# Patient Record
Sex: MALE | Race: WHITE | NOT HISPANIC OR LATINO | Employment: FULL TIME | URBAN - METROPOLITAN AREA
[De-identification: names, ages, dates, MRNs, and addresses within clinical notes are randomized per-mention and may not be internally consistent; named-entity substitution may affect disease eponyms.]

---

## 2017-01-04 ENCOUNTER — GENERIC CONVERSION - ENCOUNTER (OUTPATIENT)
Dept: OTHER | Facility: OTHER | Age: 52
End: 2017-01-04

## 2017-01-12 ENCOUNTER — ALLSCRIPTS OFFICE VISIT (OUTPATIENT)
Dept: OTHER | Facility: OTHER | Age: 52
End: 2017-01-12

## 2017-02-01 ENCOUNTER — GENERIC CONVERSION - ENCOUNTER (OUTPATIENT)
Dept: OTHER | Facility: OTHER | Age: 52
End: 2017-02-01

## 2017-02-01 ENCOUNTER — APPOINTMENT (OUTPATIENT)
Dept: LAB | Facility: CLINIC | Age: 52
End: 2017-02-01
Payer: COMMERCIAL

## 2017-02-01 ENCOUNTER — TRANSCRIBE ORDERS (OUTPATIENT)
Dept: LAB | Facility: CLINIC | Age: 52
End: 2017-02-01

## 2017-02-01 DIAGNOSIS — R59.9 ADENOPATHY: Primary | ICD-10-CM

## 2017-02-01 LAB — VENIPUNCTURE: NORMAL

## 2017-02-01 PROCEDURE — 36415 COLL VENOUS BLD VENIPUNCTURE: CPT | Performed by: INTERNAL MEDICINE

## 2017-02-02 LAB
AMBIGUOUS TEST ORDER (HISTORICAL): NORMAL
ANTINUCLEAR ANTIBODIES, IFA (HISTORICAL): NEGATIVE
LYME IGG/IGM AB (HISTORICAL): <0.91 ISR (ref 0–0.9)
LYME IGM (HISTORICAL): <0.8 INDEX (ref 0–0.79)
RA LATEX TURBID (HISTORICAL): 11.5 IU/ML (ref 0–13.9)

## 2017-02-03 ENCOUNTER — ALLSCRIPTS OFFICE VISIT (OUTPATIENT)
Dept: OTHER | Facility: OTHER | Age: 52
End: 2017-02-03

## 2017-02-03 ENCOUNTER — GENERIC CONVERSION - ENCOUNTER (OUTPATIENT)
Dept: OTHER | Facility: OTHER | Age: 52
End: 2017-02-03

## 2017-02-03 DIAGNOSIS — M25.511 PAIN IN RIGHT SHOULDER: ICD-10-CM

## 2017-02-03 LAB
GLUCOSE FASTING (HISTORICAL): 93
HBA1C MFR BLD HPLC: 5.3 %
WRITTEN AUTHORIZATION (HISTORICAL): NORMAL

## 2017-02-06 LAB
EBV EARLY ANTIGEN AB, IGG (HISTORICAL): 83.6 U/ML (ref 0–8.9)
EBV INTERPRETATION (HISTORICAL): ABNORMAL
EPSTEIN-BARR NUCLEAR ANTIGEN AB IGG (HISTORICAL): >600 U/ML (ref 0–17.9)
EPSTEIN-BARR VCA IGG (HISTORICAL): >600 U/ML (ref 0–17.9)
EPSTEIN-BARR VCA IGM (HISTORICAL): <36 U/ML (ref 0–35.9)

## 2017-02-07 ENCOUNTER — GENERIC CONVERSION - ENCOUNTER (OUTPATIENT)
Dept: OTHER | Facility: OTHER | Age: 52
End: 2017-02-07

## 2017-06-09 ENCOUNTER — APPOINTMENT (OUTPATIENT)
Dept: LAB | Facility: CLINIC | Age: 52
End: 2017-06-09
Payer: COMMERCIAL

## 2017-06-09 ENCOUNTER — TRANSCRIBE ORDERS (OUTPATIENT)
Dept: LAB | Facility: CLINIC | Age: 52
End: 2017-06-09

## 2017-06-09 DIAGNOSIS — M25.50 PAIN IN JOINT, SITE UNSPECIFIED: ICD-10-CM

## 2017-06-09 DIAGNOSIS — M06.4 INFLAMMATORY POLYARTHROPATHY (HCC): ICD-10-CM

## 2017-06-09 DIAGNOSIS — M25.50 PAIN IN JOINT, SITE UNSPECIFIED: Primary | ICD-10-CM

## 2017-06-09 DIAGNOSIS — Z79.899 ENCOUNTER FOR LONG-TERM (CURRENT) USE OF OTHER MEDICATIONS: ICD-10-CM

## 2017-06-09 LAB
BACTERIA UR QL AUTO: NORMAL /HPF
BILIRUB UR QL STRIP: NEGATIVE
CK SERPL-CCNC: 64 U/L (ref 39–308)
CLARITY UR: CLEAR
COLOR UR: YELLOW
CRP SERPL QL: <3 MG/L
ERYTHROCYTE [SEDIMENTATION RATE] IN BLOOD: 5 MM/HOUR (ref 0–10)
GLUCOSE UR STRIP-MCNC: NEGATIVE MG/DL
HGB UR QL STRIP.AUTO: NEGATIVE
HYALINE CASTS #/AREA URNS LPF: NORMAL /LPF
KETONES UR STRIP-MCNC: NEGATIVE MG/DL
LEUKOCYTE ESTERASE UR QL STRIP: NEGATIVE
NITRITE UR QL STRIP: NEGATIVE
NON-SQ EPI CELLS URNS QL MICRO: NORMAL /HPF
PH UR STRIP.AUTO: 7.5 [PH] (ref 4.5–8)
PROT UR STRIP-MCNC: NEGATIVE MG/DL
RBC #/AREA URNS AUTO: NORMAL /HPF
SP GR UR STRIP.AUTO: 1.02 (ref 1–1.03)
URATE SERPL-MCNC: 6.3 MG/DL (ref 4.2–8)
UROBILINOGEN UR QL STRIP.AUTO: 1 E.U./DL
WBC #/AREA URNS AUTO: NORMAL /HPF

## 2017-06-09 PROCEDURE — 82550 ASSAY OF CK (CPK): CPT | Performed by: INTERNAL MEDICINE

## 2017-06-09 PROCEDURE — 86140 C-REACTIVE PROTEIN: CPT | Performed by: INTERNAL MEDICINE

## 2017-06-09 PROCEDURE — 86200 CCP ANTIBODY: CPT

## 2017-06-09 PROCEDURE — 84550 ASSAY OF BLOOD/URIC ACID: CPT | Performed by: INTERNAL MEDICINE

## 2017-06-09 PROCEDURE — 81001 URINALYSIS AUTO W/SCOPE: CPT | Performed by: INTERNAL MEDICINE

## 2017-06-09 PROCEDURE — 85652 RBC SED RATE AUTOMATED: CPT | Performed by: INTERNAL MEDICINE

## 2017-06-09 PROCEDURE — 36415 COLL VENOUS BLD VENIPUNCTURE: CPT | Performed by: INTERNAL MEDICINE

## 2017-06-12 LAB — CCP IGA+IGG SERPL IA-ACNC: 13 UNITS (ref 0–19)

## 2017-10-06 ENCOUNTER — HOSPITAL ENCOUNTER (EMERGENCY)
Facility: HOSPITAL | Age: 52
Discharge: HOME | End: 2017-10-07
Attending: EMERGENCY MEDICINE | Admitting: EMERGENCY MEDICINE
Payer: COMMERCIAL

## 2017-10-06 DIAGNOSIS — R60.9 PERIPHERAL EDEMA: Primary | ICD-10-CM

## 2017-10-06 RX ORDER — OXYCODONE HYDROCHLORIDE AND ACETAMINOPHEN 5; 325 MG/1; MG/1
1 TABLET ORAL EVERY 8 HOURS PRN
COMMUNITY
End: 2019-10-05 | Stop reason: HOSPADM

## 2017-10-06 RX ORDER — TADALAFIL 10 MG/1
10 TABLET ORAL DAILY PRN
COMMUNITY
End: 2018-03-08

## 2017-10-06 RX ORDER — METOPROLOL TARTRATE 50 MG/1
50 TABLET, FILM COATED ORAL EVERY 12 HOURS SCHEDULED
COMMUNITY
End: 2017-11-21

## 2017-10-07 VITALS
DIASTOLIC BLOOD PRESSURE: 59 MMHG | HEART RATE: 62 BPM | BODY MASS INDEX: 31.39 KG/M2 | WEIGHT: 200 LBS | RESPIRATION RATE: 22 BRPM | TEMPERATURE: 98.4 F | HEIGHT: 67 IN | SYSTOLIC BLOOD PRESSURE: 143 MMHG | OXYGEN SATURATION: 96 %

## 2017-10-07 LAB
ALBUMIN SERPL BCP-MCNC: 3.5 G/DL (ref 3.5–5)
ALP SERPL-CCNC: 76 U/L (ref 46–116)
ALT SERPL W P-5'-P-CCNC: 32 U/L (ref 12–78)
ANION GAP SERPL CALCULATED.3IONS-SCNC: 8 MMOL/L (ref 4–13)
APTT PPP: 24 SECONDS (ref 23–35)
AST SERPL W P-5'-P-CCNC: 21 U/L (ref 5–45)
BASOPHILS # BLD AUTO: 0 THOUSANDS/ΜL (ref 0–0.1)
BASOPHILS NFR BLD AUTO: 1 % (ref 0–1)
BILIRUB SERPL-MCNC: 0.3 MG/DL (ref 0.2–1)
BUN SERPL-MCNC: 11 MG/DL (ref 5–25)
CALCIUM SERPL-MCNC: 9 MG/DL (ref 8.3–10.1)
CHLORIDE SERPL-SCNC: 106 MMOL/L (ref 100–108)
CO2 SERPL-SCNC: 28 MMOL/L (ref 21–32)
CREAT SERPL-MCNC: 1.08 MG/DL (ref 0.6–1.3)
DEPRECATED D DIMER PPP: 430 NG/ML (FEU) (ref 190–520)
EOSINOPHIL # BLD AUTO: 0.5 THOUSAND/ΜL (ref 0–0.61)
EOSINOPHIL NFR BLD AUTO: 10 % (ref 0–6)
ERYTHROCYTE [DISTWIDTH] IN BLOOD BY AUTOMATED COUNT: 13.6 % (ref 11.6–15.1)
GFR SERPL CREATININE-BSD FRML MDRD: 77 ML/MIN/1.73SQ M
GLUCOSE SERPL-MCNC: 93 MG/DL (ref 65–140)
HCT VFR BLD AUTO: 37.7 % (ref 42–52)
HGB BLD-MCNC: 12.5 G/DL (ref 14–18)
INR PPP: 1.06 (ref 0.86–1.16)
LYMPHOCYTES # BLD AUTO: 1.5 THOUSANDS/ΜL (ref 0.6–4.47)
LYMPHOCYTES NFR BLD AUTO: 30 % (ref 14–44)
MAGNESIUM SERPL-MCNC: 2 MG/DL (ref 1.6–2.6)
MCH RBC QN AUTO: 31 PG (ref 27–31)
MCHC RBC AUTO-ENTMCNC: 33.2 G/DL (ref 31.4–37.4)
MCV RBC AUTO: 93 FL (ref 82–98)
MONOCYTES # BLD AUTO: 0.5 THOUSAND/ΜL (ref 0.17–1.22)
MONOCYTES NFR BLD AUTO: 11 % (ref 4–12)
NEUTROPHILS # BLD AUTO: 2.4 THOUSANDS/ΜL (ref 1.85–7.62)
NEUTS SEG NFR BLD AUTO: 48 % (ref 43–75)
NRBC BLD AUTO-RTO: 0 /100 WBCS
NT-PROBNP SERPL-MCNC: 613 PG/ML
PHOSPHATE SERPL-MCNC: 4 MG/DL (ref 2.7–4.5)
PLATELET # BLD AUTO: 239 THOUSANDS/UL (ref 130–400)
PMV BLD AUTO: 7.3 FL (ref 8.9–12.7)
POTASSIUM SERPL-SCNC: 3.8 MMOL/L (ref 3.5–5.3)
PROT SERPL-MCNC: 6.4 G/DL (ref 6.4–8.2)
PROTHROMBIN TIME: 11.1 SECONDS (ref 9.4–11.7)
RBC # BLD AUTO: 4.04 MILLION/UL (ref 4.7–6.1)
SODIUM SERPL-SCNC: 142 MMOL/L (ref 136–145)
TROPONIN I SERPL-MCNC: <0.02 NG/ML
TSH SERPL DL<=0.05 MIU/L-ACNC: 4.87 UIU/ML (ref 0.36–3.74)
WBC # BLD AUTO: 4.9 THOUSAND/UL (ref 4.8–10.8)

## 2017-10-07 PROCEDURE — 84484 ASSAY OF TROPONIN QUANT: CPT | Performed by: EMERGENCY MEDICINE

## 2017-10-07 PROCEDURE — 36415 COLL VENOUS BLD VENIPUNCTURE: CPT | Performed by: EMERGENCY MEDICINE

## 2017-10-07 PROCEDURE — 99283 EMERGENCY DEPT VISIT LOW MDM: CPT

## 2017-10-07 PROCEDURE — 80053 COMPREHEN METABOLIC PANEL: CPT | Performed by: EMERGENCY MEDICINE

## 2017-10-07 PROCEDURE — 84100 ASSAY OF PHOSPHORUS: CPT | Performed by: EMERGENCY MEDICINE

## 2017-10-07 PROCEDURE — 84443 ASSAY THYROID STIM HORMONE: CPT | Performed by: EMERGENCY MEDICINE

## 2017-10-07 PROCEDURE — 83735 ASSAY OF MAGNESIUM: CPT | Performed by: EMERGENCY MEDICINE

## 2017-10-07 PROCEDURE — 83880 ASSAY OF NATRIURETIC PEPTIDE: CPT | Performed by: EMERGENCY MEDICINE

## 2017-10-07 PROCEDURE — 93005 ELECTROCARDIOGRAM TRACING: CPT | Performed by: EMERGENCY MEDICINE

## 2017-10-07 PROCEDURE — 85610 PROTHROMBIN TIME: CPT | Performed by: EMERGENCY MEDICINE

## 2017-10-07 PROCEDURE — 85025 COMPLETE CBC W/AUTO DIFF WBC: CPT | Performed by: EMERGENCY MEDICINE

## 2017-10-07 PROCEDURE — 85379 FIBRIN DEGRADATION QUANT: CPT | Performed by: EMERGENCY MEDICINE

## 2017-10-07 PROCEDURE — 85730 THROMBOPLASTIN TIME PARTIAL: CPT | Performed by: EMERGENCY MEDICINE

## 2017-10-07 NOTE — DISCHARGE INSTRUCTIONS
Leg Edema   WHAT YOU NEED TO KNOW:   Leg edema is swelling caused by fluid buildup  Your legs may swell if you sit or stand for long periods of time, are pregnant, or are injured  Swelling may also occur if you have heart failure or circulation problems  This means that your heart does not pump blood through your body as it should  DISCHARGE INSTRUCTIONS:   Self-care:   · Elevate your legs:  Raise your legs above the level of your heart as often as you can  This will help decrease swelling and pain  Prop your legs on pillows or blankets to keep them elevated comfortably  · Wear pressure stockings: These tight stockings put pressure on your legs to promote blood flow and prevent blood clots  Wear the stockings during the day  Do not wear them while you sleep  · Apply heat:  Heat helps decrease pain and swelling  Apply heat on the area for 20 to 30 minutes every 2 hours for as many days as directed  · Stay active:  Do not stand or sit for long periods of time  Ask your healthcare provider about the best exercise plan for you  · Eat healthy foods:  Healthy foods include fruits, vegetables, whole-grain breads, low-fat dairy products, beans, lean meats, and fish  Ask if you need to be on a special diet  Limit salt  Salt will make your body hold even more fluid  Follow up with your healthcare provider as directed:  Write down your questions so you remember to ask them during your visits  Contact your healthcare provider if:   · You have a fever or feel more tired than usual     · The veins in your legs look larger than usual  They may look full or bulging  · Your legs itch or feel heavy  · You have red or white areas or sores on your legs  The skin may also appear dimpled or have indentations  · You are gaining weight  · You have trouble moving your ankles  · The swelling does not go away, or other parts of your body swell      · You have questions or concerns about your condition or care   Return to the emergency department if:   · You cannot walk  · You feel faint or confused  · Your skin turns blue or gray  · Your leg feels warm, tender, and painful  It may be swollen and red  · You have chest pain or trouble breathing that is worse when you lie down  · You suddenly feel lightheaded and have trouble breathing  · You have new and sudden chest pain  You may have more pain when you take deep breaths or cough  You may also cough up blood  © 2017 [address]  Information is for End User's use only and may not be sold, redistributed or otherwise used for commercial purposes  All illustrations and images included in CareNotes® are the copyrighted property of A D A M , Inc  or William Godoy  The above information is an  only  It is not intended as medical advice for individual conditions or treatments  Talk to your doctor, nurse or pharmacist before following any medical regimen to see if it is safe and effective for you

## 2017-10-07 NOTE — ED PROVIDER NOTES
History  Chief Complaint   Patient presents with    Leg Swelling     Pt c/o L leg swelling since 10pm        57-year-old white male presents with complaints of bilateral lower extremity edema that he noticed tonight  States left was worse than right  No prior history of same  Not on any diuretics  No history of heart failure  No history of DVT  Denies chest pain, states he feels slightly short of breath, patient in paroxysmal AFib only on aspirin and no other blood thinners  History provided by:  Patient and spouse      Prior to Admission Medications   Prescriptions Last Dose Informant Patient Reported? Taking? Dronedarone HCl (MULTAQ PO) 10/6/2017 at Unknown time  Yes Yes   Sig: Take 400 mg by mouth 2 (two) times a day     aspirin 81 MG tablet 10/6/2017 at Unknown time  Yes Yes   Sig: Take 81 mg by mouth daily  diltiazem (CARDIZEM CD) 360 MG 24 hr capsule 10/6/2017 at Unknown time  Yes Yes   Sig: Take 360 mg by mouth daily  esomeprazole (NexIUM) 20 mg capsule  Self Yes Yes   Sig: Take 20 mg by mouth every morning before breakfast   metoprolol tartrate (LOPRESSOR) 50 mg tablet 10/6/2017 at Unknown time Self Yes Yes   Sig: Take 50 mg by mouth every 12 (twelve) hours   oxyCODONE-acetaminophen (PERCOCET) 5-325 mg per tablet  Self Yes Yes   Sig: Take 1 tablet by mouth every 8 (eight) hours as needed for moderate pain   tadalafil (CIALIS) 10 MG tablet 10/6/2017 at Unknown time Self Yes Yes   Sig: Take 10 mg by mouth daily as needed for erectile dysfunction      Facility-Administered Medications: None       Past Medical History:   Diagnosis Date    Atrial fibrillation ([facility] [address] )     Graham Whyte infection     GERD (gastroesophageal reflux disease)        Past Surgical History:   Procedure Laterality Date    CHOLECYSTECTOMY      LAPAROSCOPIC GASTRIC BANDING      gastric sleeve       History reviewed  No pertinent family history  I have reviewed and agree with the history as documented      Social History   Substance Use Topics    Smoking status: Current Every Day Smoker     Types: E-Cigarettes    Smokeless tobacco: Never Used    Alcohol use No        Review of Systems   Constitutional: Negative for chills and fever  HENT: Negative  Eyes: Negative  Respiratory: Positive for shortness of breath  Negative for chest tightness, wheezing and stridor  Cardiovascular: Positive for leg swelling  Negative for chest pain  Patient id out AFib   Gastrointestinal: Negative  Genitourinary: Negative  Musculoskeletal: Negative  Skin: Negative  Neurological: Negative  Hematological: Negative  Psychiatric/Behavioral: Negative  All other systems reviewed and are negative  Physical Exam  ED Triage Vitals [10/06/17 2347]   Temperature Pulse Respirations Blood Pressure SpO2   98 4 °F (36 9 °C) 67 16 137/64 98 %      Temp Source Heart Rate Source Patient Position - Orthostatic VS BP Location FiO2 (%)   Oral Monitor Lying Left arm --      Pain Score       No Pain           Physical Exam   Constitutional: He is oriented to person, place, and time  He appears well-developed and well-nourished  HENT:   Head: Normocephalic and atraumatic  Right Ear: External ear normal    Left Ear: External ear normal    Nose: Nose normal    Mouth/Throat: Oropharynx is clear and moist    Eyes: Conjunctivae and EOM are normal    Neck: Normal range of motion  Neck supple  Cardiovascular: Normal rate, normal heart sounds, intact distal pulses and normal pulses  An irregularly irregular rhythm present  Pulmonary/Chest: Effort normal and breath sounds normal    Abdominal: Soft  Bowel sounds are normal    Musculoskeletal: Normal range of motion  Neurological: He is alert and oriented to person, place, and time  Skin: Skin is warm and dry  Capillary refill takes less than 2 seconds  Psychiatric: He has a normal mood and affect   His behavior is normal  Judgment and thought content normal    Nursing note and vitals reviewed  ED Medications  Medications - No data to display    Diagnostic Studies  Labs Reviewed   CBC AND DIFFERENTIAL - Abnormal        Result Value Ref Range Status    RBC 4 04 (*) 4 70 - 6 10 Million/uL Final    Hemoglobin 12 5 (*) 14 0 - 18 0 g/dL Final    Hematocrit 37 7 (*) 42 0 - 52 0 % Final    MPV 7 3 (*) 8 9 - 12 7 fL Final    Eosinophils Relative 10 (*) 0 - 6 % Final    WBC 4 90  4 80 - 10 80 Thousand/uL Final    MCV 93  82 - 98 fL Final    MCH 31 0  27 0 - 31 0 pg Final    MCHC 33 2  31 4 - 37 4 g/dL Final    RDW 13 6  11 6 - 15 1 % Final    Platelets 927  924 - 400 Thousands/uL Final    nRBC 0  /100 WBCs Final    Neutrophils Relative 48  43 - 75 % Final    Lymphocytes Relative 30  14 - 44 % Final    Monocytes Relative 11  4 - 12 % Final    Basophils Relative 1  0 - 1 % Final    Neutrophils Absolute 2 40  1 85 - 7 62 Thousands/µL Final    Lymphocytes Absolute 1 50  0 60 - 4 47 Thousands/µL Final    Monocytes Absolute 0 50  0 17 - 1 22 Thousand/µL Final    Eosinophils Absolute 0 50  0 00 - 0 61 Thousand/µL Final    Basophils Absolute 0 00  0 00 - 0 10 Thousands/µL Final   NT-BNP PRO (BRAIN NATRIURETIC PEPTIDE) - Abnormal     NT-proBNP 613 (*) <125 pg/mL Final   TSH, 3RD GENERATION - Abnormal     TSH 3RD GENERATON 4 874 (*) 0 358 - 3 740 uIU/mL Final    Narrative:     Patients undergoing fluorescein dye angiography may retain small amounts of fluorescein in the body for 48-72 hours post procedure  Samples containing fluorescein can produce falsely depressed TSH values  If the patient had this procedure,a specimen should be resubmitted post fluorescein clearance  D-DIMER, QUANTITATIVE - Normal    D-Dimer, Quant 430  190 - 520 ng/ml (FEU) Final    Comment:   Reference and upper limits to exclude DVT and PE are the same  Do not use to exclude if clinical symptoms are present         TROPONIN I - Normal    Troponin I <0 02  <=0 04 ng/mL Final    Comment: 3Autovalidation override Narrative:     [employer] Chemistry analyzer 99% cutoff is > 0 04 ng/mL in network labs    o cTnI 99% cutoff is useful only when applied to patients in the clinical setting of myocardial ischemia  o cTnI 99% cutoff should be interpreted in the context of clinical history, ECG findings and possibly cardiac imaging to establish correct diagnosis  o cTnI 99% cutoff may be suggestive but clearly not indicative of a coronary event without the clinical setting of myocardial ischemia  PROTIME-INR - Normal    Protime 11 1  9 4 - 11 7 seconds Final    INR 1 06  0 86 - 1 16 Final   APTT - Normal    PTT 24  23 - 35 seconds Final    Narrative: Therapeutic Heparin Range = 60-90 seconds   MAGNESIUM - Normal    Magnesium 2 0  1 6 - 2 6 mg/dL Final   PHOSPHORUS - Normal    Phosphorus 4 0  2 7 - 4 5 mg/dL Final   COMPREHENSIVE METABOLIC PANEL    Sodium 278  136 - 145 mmol/L Final    Potassium 3 8  3 5 - 5 3 mmol/L Final    Chloride 106  100 - 108 mmol/L Final    CO2 28  21 - 32 mmol/L Final    Anion Gap 8  4 - 13 mmol/L Final    BUN 11  5 - 25 mg/dL Final    Creatinine 1 08  0 60 - 1 30 mg/dL Final    Comment: Standardized to IDMS reference method    Glucose 93  65 - 140 mg/dL Final    Comment:   If the patient is fasting, the ADA then defines impaired fasting glucose as > 100 mg/dL and diabetes as > or equal to 123 mg/dL  Specimen collection should occur prior to Sulfasalazine administration due to the potential for falsely depressed results  Specimen collection should occur prior to Sulfapyridine administration due to the potential for falsely elevated results  Calcium 9 0  8 3 - 10 1 mg/dL Final    AST 21  5 - 45 U/L Final    Comment:   Specimen collection should occur prior to Sulfasalazine administration due to the potential for falsely depressed results  ALT 32  12 - 78 U/L Final    Comment:   Specimen collection should occur prior to Sulfasalazine administration due to the potential for falsely depressed results  Alkaline Phosphatase 76  46 - 116 U/L Final    Total Protein 6 4  6 4 - 8 2 g/dL Final    Albumin 3 5  3 5 - 5 0 g/dL Final    Total Bilirubin 0 30  0 20 - 1 00 mg/dL Final    eGFR 77  ml/min/1 73sq m Final    Narrative:     National Kidney Disease Education Program recommendations are as follows:  GFR calculation is accurate only with a steady state creatinine  Chronic Kidney disease less than 60 ml/min/1 73 sq  meters  Kidney failure less than 15 ml/min/1 73 sq  meters  UA W REFLEX TO MICROSCOPIC WITH REFLEX TO CULTURE       No orders to display       Procedures  ECG 12 Lead Documentation  Date/Time: 10/7/2017 12:10 AM  Performed by: Davy Mckeon  Authorized by: Berenice DE LA VEGA     Indications / Diagnosis:  Leg swelling/palpitations  ECG reviewed by me, the ED Provider: yes    Patient location:  ED  Previous ECG:     Comparison to cardiac monitor: Yes (Irreg irreg 66)    Interpretation:     Interpretation: abnormal    Rate:     ECG rate:  66    ECG rate assessment: normal    Rhythm:     Rhythm: atrial fibrillation    Ectopy:     Ectopy: none    QRS:     QRS axis:  Normal    QRS intervals:  Normal  Conduction:     Conduction: normal    ST segments:     ST segments:  Normal  T waves:     T waves: normal            Phone Contacts  ED Phone Contact    ED Course  ED Course                                MDM  CritCare Time    Disposition  Final diagnoses:   Peripheral edema     ED Disposition     ED Disposition Condition Comment    Discharge  Faraz Merline discharge to home/self care  Condition at discharge: Stable        Follow-up Information     Follow up With Specialties Details Why Contact Gamaliel Swartz DO Family Medicine Schedule an appointment as soon as possible for a visit in 3 days  [address]  [phone]          Patient's Medications   Discharge Prescriptions    No medications on file     No discharge procedures on file      ED Provider  Electronically Signed by Sabas Rollins MD  10/07/17 5256

## 2017-10-09 LAB
ATRIAL RATE: 394 BPM
QRS AXIS: 8 DEGREES
QRSD INTERVAL: 98 MS
QT INTERVAL: 448 MS
QTC INTERVAL: 469 MS
T WAVE AXIS: 5 DEGREES
VENTRICULAR RATE: 66 BPM

## 2017-10-12 ENCOUNTER — GENERIC CONVERSION - ENCOUNTER (OUTPATIENT)
Dept: OTHER | Facility: OTHER | Age: 52
End: 2017-10-12

## 2017-10-23 ENCOUNTER — GENERIC CONVERSION - ENCOUNTER (OUTPATIENT)
Dept: OTHER | Facility: OTHER | Age: 52
End: 2017-10-23

## 2017-10-29 LAB
. (HISTORICAL): NORMAL

## 2017-11-01 ENCOUNTER — ALLSCRIPTS OFFICE VISIT (OUTPATIENT)
Dept: OTHER | Facility: OTHER | Age: 52
End: 2017-11-01

## 2017-11-01 ENCOUNTER — GENERIC CONVERSION - ENCOUNTER (OUTPATIENT)
Dept: OTHER | Facility: OTHER | Age: 52
End: 2017-11-01

## 2017-11-05 LAB
CULTURE RESULT (HISTORICAL): NORMAL
RESULT 1 (HISTORICAL): NORMAL

## 2017-11-21 ENCOUNTER — APPOINTMENT (EMERGENCY)
Dept: RADIOLOGY | Facility: HOSPITAL | Age: 52
End: 2017-11-21
Payer: COMMERCIAL

## 2017-11-21 ENCOUNTER — HOSPITAL ENCOUNTER (EMERGENCY)
Facility: HOSPITAL | Age: 52
Discharge: HOME | End: 2017-11-21
Attending: EMERGENCY MEDICINE | Admitting: EMERGENCY MEDICINE
Payer: COMMERCIAL

## 2017-11-21 VITALS
TEMPERATURE: 97.6 F | WEIGHT: 215 LBS | HEIGHT: 67 IN | SYSTOLIC BLOOD PRESSURE: 98 MMHG | BODY MASS INDEX: 33.74 KG/M2 | OXYGEN SATURATION: 96 % | RESPIRATION RATE: 16 BRPM | HEART RATE: 60 BPM | DIASTOLIC BLOOD PRESSURE: 57 MMHG

## 2017-11-21 DIAGNOSIS — R10.9 ABDOMINAL PAIN: Primary | ICD-10-CM

## 2017-11-21 LAB
ALBUMIN SERPL BCP-MCNC: 3.6 G/DL (ref 3.5–5)
ALP SERPL-CCNC: 87 U/L (ref 46–116)
ALT SERPL W P-5'-P-CCNC: 35 U/L (ref 12–78)
ANION GAP SERPL CALCULATED.3IONS-SCNC: 8 MMOL/L (ref 4–13)
AST SERPL W P-5'-P-CCNC: 22 U/L (ref 5–45)
BASOPHILS # BLD AUTO: 0 THOUSANDS/ΜL (ref 0–0.1)
BASOPHILS NFR BLD AUTO: 1 % (ref 0–1)
BILIRUB SERPL-MCNC: 0.6 MG/DL (ref 0.2–1)
BILIRUB UR QL STRIP: ABNORMAL
BUN SERPL-MCNC: 11 MG/DL (ref 5–25)
CALCIUM SERPL-MCNC: 8.8 MG/DL (ref 8.3–10.1)
CHLORIDE SERPL-SCNC: 103 MMOL/L (ref 100–108)
CLARITY UR: CLEAR
CO2 SERPL-SCNC: 28 MMOL/L (ref 21–32)
COLOR UR: YELLOW
CREAT SERPL-MCNC: 1.03 MG/DL (ref 0.6–1.3)
EOSINOPHIL # BLD AUTO: 0.5 THOUSAND/ΜL (ref 0–0.61)
EOSINOPHIL NFR BLD AUTO: 9 % (ref 0–6)
ERYTHROCYTE [DISTWIDTH] IN BLOOD BY AUTOMATED COUNT: 13.1 % (ref 11.6–15.1)
GFR SERPL CREATININE-BSD FRML MDRD: 82 ML/MIN/1.73SQ M
GLUCOSE SERPL-MCNC: 93 MG/DL (ref 65–140)
GLUCOSE UR STRIP-MCNC: NEGATIVE MG/DL
HCT VFR BLD AUTO: 40.4 % (ref 42–52)
HGB BLD-MCNC: 13.3 G/DL (ref 14–18)
HGB UR QL STRIP.AUTO: NEGATIVE
KETONES UR STRIP-MCNC: NEGATIVE MG/DL
LEUKOCYTE ESTERASE UR QL STRIP: NEGATIVE
LIPASE SERPL-CCNC: 64 U/L (ref 73–393)
LYMPHOCYTES # BLD AUTO: 1.2 THOUSANDS/ΜL (ref 0.6–4.47)
LYMPHOCYTES NFR BLD AUTO: 23 % (ref 14–44)
MCH RBC QN AUTO: 30.6 PG (ref 27–31)
MCHC RBC AUTO-ENTMCNC: 32.9 G/DL (ref 31.4–37.4)
MCV RBC AUTO: 93 FL (ref 82–98)
MONOCYTES # BLD AUTO: 0.5 THOUSAND/ΜL (ref 0.17–1.22)
MONOCYTES NFR BLD AUTO: 10 % (ref 4–12)
NEUTROPHILS # BLD AUTO: 3 THOUSANDS/ΜL (ref 1.85–7.62)
NEUTS SEG NFR BLD AUTO: 57 % (ref 43–75)
NITRITE UR QL STRIP: NEGATIVE
NRBC BLD AUTO-RTO: 0 /100 WBCS
PH UR STRIP.AUTO: 6.5 [PH] (ref 5–9)
PLATELET # BLD AUTO: 283 THOUSANDS/UL (ref 130–400)
PMV BLD AUTO: 6.7 FL (ref 8.9–12.7)
POTASSIUM SERPL-SCNC: 3.9 MMOL/L (ref 3.5–5.3)
PROT SERPL-MCNC: 6.8 G/DL (ref 6.4–8.2)
PROT UR STRIP-MCNC: NEGATIVE MG/DL
RBC # BLD AUTO: 4.35 MILLION/UL (ref 4.7–6.1)
SODIUM SERPL-SCNC: 139 MMOL/L (ref 136–145)
SP GR UR STRIP.AUTO: 1.02 (ref 1–1.03)
UROBILINOGEN UR QL STRIP.AUTO: 0.2 E.U./DL
WBC # BLD AUTO: 5.3 THOUSAND/UL (ref 4.8–10.8)

## 2017-11-21 PROCEDURE — 96374 THER/PROPH/DIAG INJ IV PUSH: CPT

## 2017-11-21 PROCEDURE — 85025 COMPLETE CBC W/AUTO DIFF WBC: CPT | Performed by: EMERGENCY MEDICINE

## 2017-11-21 PROCEDURE — 99284 EMERGENCY DEPT VISIT MOD MDM: CPT

## 2017-11-21 PROCEDURE — 74176 CT ABD & PELVIS W/O CONTRAST: CPT

## 2017-11-21 PROCEDURE — 96361 HYDRATE IV INFUSION ADD-ON: CPT

## 2017-11-21 PROCEDURE — 83690 ASSAY OF LIPASE: CPT | Performed by: EMERGENCY MEDICINE

## 2017-11-21 PROCEDURE — 81003 URINALYSIS AUTO W/O SCOPE: CPT | Performed by: EMERGENCY MEDICINE

## 2017-11-21 PROCEDURE — 36415 COLL VENOUS BLD VENIPUNCTURE: CPT | Performed by: EMERGENCY MEDICINE

## 2017-11-21 PROCEDURE — 80053 COMPREHEN METABOLIC PANEL: CPT | Performed by: EMERGENCY MEDICINE

## 2017-11-21 RX ORDER — METOPROLOL TARTRATE 50 MG/1
25 TABLET, FILM COATED ORAL EVERY 12 HOURS SCHEDULED
COMMUNITY
End: 2018-03-08

## 2017-11-21 RX ORDER — PANTOPRAZOLE SODIUM 40 MG/1
40 TABLET, DELAYED RELEASE ORAL DAILY
COMMUNITY
End: 2018-09-06 | Stop reason: SDUPTHER

## 2017-11-21 RX ORDER — KETOROLAC TROMETHAMINE 30 MG/ML
15 INJECTION, SOLUTION INTRAMUSCULAR; INTRAVENOUS ONCE
Status: COMPLETED | OUTPATIENT
Start: 2017-11-21 | End: 2017-11-21

## 2017-11-21 RX ORDER — ONDANSETRON 2 MG/ML
4 INJECTION INTRAMUSCULAR; INTRAVENOUS ONCE
Status: DISCONTINUED | OUTPATIENT
Start: 2017-11-21 | End: 2017-11-21 | Stop reason: HOSPADM

## 2017-11-21 RX ADMIN — SODIUM CHLORIDE 1000 ML: 0.9 INJECTION, SOLUTION INTRAVENOUS at 15:57

## 2017-11-21 RX ADMIN — KETOROLAC TROMETHAMINE 15 MG: 30 INJECTION, SOLUTION INTRAMUSCULAR at 16:02

## 2017-11-21 NOTE — ED PROVIDER NOTES
History  Chief Complaint   Patient presents with    Abdominal Pain     abdominal pain x 2 days rt side in area where he had 2 surgeries,having less BM's     52M hx gastric sleeve c/o R-sided mid abd pain since Sunday, intermittent, 7/10 - 10/10  Currently pain free  Pt also has R flank pain  No urinary sxs  Prior to Admission Medications   Prescriptions Last Dose Informant Patient Reported? Taking? Dronedarone HCl (MULTAQ PO)   Yes No   Sig: Take 400 mg by mouth 2 (two) times a day     aspirin 81 MG tablet   Yes No   Sig: Take 81 mg by mouth daily  diltiazem (CARDIZEM CD) 360 MG 24 hr capsule   Yes No   Sig: Take 360 mg by mouth daily  metoprolol tartrate (LOPRESSOR) 50 mg tablet   Yes Yes   Sig: Take 25 mg by mouth every 12 (twelve) hours   oxyCODONE-acetaminophen (PERCOCET) 5-325 mg per tablet  Self Yes No   Sig: Take 1 tablet by mouth every 8 (eight) hours as needed for moderate pain   pantoprazole (PROTONIX) 40 mg tablet   Yes Yes   Sig: Take 40 mg by mouth daily   tadalafil (CIALIS) 10 MG tablet  Self Yes No   Sig: Take 10 mg by mouth daily as needed for erectile dysfunction      Facility-Administered Medications: None       Past Medical History:   Diagnosis Date    Atrial fibrillation ([facility])     Graham Whyte infection     GERD (gastroesophageal reflux disease)        Past Surgical History:   Procedure Laterality Date    APPENDECTOMY      CHOLECYSTECTOMY      LAPAROSCOPIC GASTRIC BANDING      gastric sleeve       History reviewed  No pertinent family history  I have reviewed and agree with the history as documented  Social History   Substance Use Topics    Smoking status: Current Every Day Smoker     Types: E-Cigarettes    Smokeless tobacco: Never Used    Alcohol use No        Review of Systems   Constitutional: Negative for fever  Respiratory: Negative for cough  Gastrointestinal: Positive for abdominal pain, diarrhea and nausea  Negative for vomiting  Musculoskeletal: Negative for back pain  Neurological: Negative for headaches  All other systems reviewed and are negative  Physical Exam  ED Triage Vitals   Temperature Pulse Respirations Blood Pressure SpO2   11/21/17 1509 11/21/17 1513 11/21/17 1513 11/21/17 1513 11/21/17 1513   97 6 °F (36 4 °C) 62 16 113/63 96 %      Temp Source Heart Rate Source Patient Position - Orthostatic VS BP Location FiO2 (%)   11/21/17 1509 11/21/17 1509 11/21/17 1513 11/21/17 1509 --   Oral Monitor Lying Left arm       Pain Score       11/21/17 1513       7           Orthostatic Vital Signs  Vitals:    11/21/17 1513 11/21/17 1745   BP: 113/63 98/57   Pulse: 62 60   Patient Position - Orthostatic VS: Lying        Physical Exam   Constitutional: He is oriented to person, place, and time  He appears well-developed  HENT:   Mouth/Throat: Oropharynx is clear and moist    Eyes: Conjunctivae are normal    Neck: Neck supple  Cardiovascular: Normal rate and regular rhythm  Pulmonary/Chest: Effort normal and breath sounds normal    Abdominal: Soft  Bowel sounds are normal  There is no tenderness  Neurological: He is alert and oriented to person, place, and time  Skin: Skin is warm and dry  Psychiatric: He has a normal mood and affect  Vitals reviewed        ED Medications  Medications   ondansetron (ZOFRAN) injection 4 mg (4 mg Intravenous Not Given 11/21/17 1601)   sodium chloride 0 9 % bolus 1,000 mL (0 mL Intravenous Stopped 11/21/17 1740)   ketorolac (TORADOL) 30 mg/mL injection 15 mg (15 mg Intravenous Given 11/21/17 1602)       Diagnostic Studies  Results Reviewed     Procedure Component Value Units Date/Time    Comprehensive metabolic panel [[id number]] Collected:  11/21/17 1558    Lab Status:  Final result Specimen:  Blood from Arm, Right Updated:  11/21/17 1625     Sodium 139 mmol/L      Potassium 3 9 mmol/L      Chloride 103 mmol/L      CO2 28 mmol/L      Anion Gap 8 mmol/L      BUN 11 mg/dL      Creatinine 1 03 mg/dL      Glucose 93 mg/dL      Calcium 8 8 mg/dL      AST 22 U/L      ALT 35 U/L      Alkaline Phosphatase 87 U/L      Total Protein 6 8 g/dL      Albumin 3 6 g/dL      Total Bilirubin 0 60 mg/dL      eGFR 82 ml/min/1 73sq m     Narrative:         National Kidney Disease Education Program recommendations are as follows:  GFR calculation is accurate only with a steady state creatinine  Chronic Kidney disease less than 60 ml/min/1 73 sq  meters  Kidney failure less than 15 ml/min/1 73 sq  meters      Lipase [[id number]]  (Abnormal) Collected:  11/21/17 1558    Lab Status:  Final result Specimen:  Blood from Arm, Right Updated:  11/21/17 1625     Lipase 64 (L) u/L     UA w Reflex to Microscopic w Reflex to Culture [[id number]]  (Abnormal) Collected:  11/21/17 1602    Lab Status:  Final result Specimen:  Urine from Urine, Clean Catch Updated:  11/21/17 1611     Color, UA Yellow     Clarity, UA Clear     Specific Gravity, UA 1 025     pH, UA 6 5     Leukocytes, UA Negative     Nitrite, UA Negative     Protein, UA Negative mg/dl      Glucose, UA Negative mg/dl      Ketones, UA Negative mg/dl      Urobilinogen, UA 0 2 E U /dl      Bilirubin, UA Interference- unable to analyze (A)     Blood, UA Negative    CBC and differential [[id number]]  (Abnormal) Collected:  11/21/17 1558    Lab Status:  Final result Specimen:  Blood from Arm, Right Updated:  11/21/17 1603     WBC 5 30 Thousand/uL      RBC 4 35 (L) Million/uL      Hemoglobin 13 3 (L) g/dL      Hematocrit 40 4 (L) %      MCV 93 fL      MCH 30 6 pg      MCHC 32 9 g/dL      RDW 13 1 %      MPV 6 7 (L) fL      Platelets 476 Thousands/uL      nRBC 0 /100 WBCs      Neutrophils Relative 57 %      Lymphocytes Relative 23 %      Monocytes Relative 10 %      Eosinophils Relative 9 (H) %      Basophils Relative 1 %      Neutrophils Absolute 3 00 Thousands/µL      Lymphocytes Absolute 1 20 Thousands/µL      Monocytes Absolute 0 50 Thousand/µL      Eosinophils Absolute 0 50 Thousand/µL Basophils Absolute 0 00 Thousands/µL                  CT renal stone study abdomen pelvis without contrast   Final Result by Oli Steen MD (11/21 1658)      No acute inflammatory changes within the abdomen or the pelvis  Workstation performed: [id number]                    Procedures  Procedures       Phone Contacts  ED Phone Contact    ED Course  ED Course                                MDM  Number of Diagnoses or Management Options  Abdominal pain:   Diagnosis management comments: CT and labs wnl  Pain improved  Pt given instructions in supportive care, advised to see PCP in 2-3 days for a re-check  CritCare Time    Disposition  Final diagnoses:   Abdominal pain     Time reflects when diagnosis was documented in both MDM as applicable and the Disposition within this note     Time User Action Codes Description Comment    11/21/2017  6:03 PM [place] Add [R10 9] Abdominal pain       ED Disposition     ED Disposition Condition Comment    Discharge  Leonel Loyd discharge to home/self care  Condition at discharge: Stable        Follow-up Information     Follow up With Specialties Details Why Contact [facility], [address] In 2 days  [address]  [phone]          Patient's Medications   Discharge Prescriptions    No medications on file     No discharge procedures on file      ED Provider  Electronically Signed by           Mere Llanos DO  11/21/17 7993

## 2017-11-21 NOTE — ED NOTES
Pt c/o pain at this time, states medication he got last time "didnt really work"  Dr Jose Suarez notified       Kasey Linda RN  11/21/17 9742

## 2017-11-21 NOTE — DISCHARGE INSTRUCTIONS
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
## 2018-01-12 NOTE — RESULT NOTES
Message   please have pt come in to discuss blood work  nothing to be concerned about    rl     Verified Results  (1) CBC/PLT/DIFF [id number] 12:00AM Alma Hashimoto     Test Name Result Flag Reference   WBC 5 4 x10E3/uL  3 4-10 8   RBC 4 67 x10E6/uL  4 14-5 80   Hemoglobin 13 7 g/dL  12 6-17 7   Hematocrit 42 2 %  37 5-51 0   MCV 90 fL  79-97   MCH 29 3 pg  26 6-33 0   MCHC 32 5 g/dL  31 5-35 7   RDW 14 0 %  12 3-15 4   Platelets 866 Z38Z6/LT  150-379   Neutrophils 65 %     Lymphs 19 %     Monocytes 8 %     Eos 7 %     Basos 1 %     Neutrophils (Absolute) 3 5 x10E3/uL  1 4-7 0   Lymphs (Absolute) 1 0 x10E3/uL  0 7-3 1   Monocytes(Absolute) 0 4 x10E3/uL  0 1-0 9   Eos (Absolute) 0 4 x10E3/uL  0 0-0 4   Baso (Absolute) 0 1 x10E3/uL  0 0-0 2   Immature Granulocytes 0 %     Immature Grans (Abs) 0 0 x10E3/uL  0 0-0 1     (1) COMPREHENSIVE METABOLIC PANEL [id number] 28:93FJ Alma Hashimoto     Test Name Result Flag Reference   Glucose, Serum 94 mg/dL  65-99   BUN 10 mg/dL  6-24   Creatinine, Serum 0 94 mg/dL  0 76-1 27   eGFR If NonAfricn Am 92 mL/min/1 73  >59   eGFR If Africn Am 107 mL/min/1 73  >59   BUN/Creatinine Ratio 11  9-20   Sodium, Serum 141 mmol/L  134-144   Potassium, Serum 4 2 mmol/L  3 5-5 2   Chloride, Serum 103 mmol/L     Carbon Dioxide, Total 25 mmol/L  18-29   Calcium, Serum 9 2 mg/dL  8 7-10 2   Protein, Total, Serum 6 3 g/dL  6 0-8 5   Albumin, Serum 3 9 g/dL  3 5-5 5   Globulin, Total 2 4 g/dL  1 5-4 5   A/G Ratio 1 6  1 1-2 5   Bilirubin, Total 0 5 mg/dL  0 0-1 2   Alkaline Phosphatase, S 129 IU/L H    AST (SGOT) 39 IU/L  0-40   ALT (SGPT) 28 IU/L  0-44     (1) LIPID PANEL FASTING W DIRECT LDL REFLEX [id number] 12:00AM Alma Hashimoto     Test Name Result Flag Reference   Cholesterol, Total 163 mg/dL  100-199   Triglycerides 93 mg/dL  0-149   HDL Cholesterol 60 mg/dL  >39   LDL Cholesterol Calc 84 mg/dL  0-99     (1) TSH [id number] 12:00AM Alma Hashimoto     Test Name Result Flag Reference   TSH 3 060 uIU/mL  0 450-4 500     (1) URINALYSIS (will reflex a microscopy if leukocytes, occult blood, protein or nitrites are not within normal limits) [id number] 12:00AM [facility]     Test Name Result Flag Reference   Specific Gravity 1 021  1 005-1 030   pH 7 0  5 0-7 5   Urine-Color Yellow  Yellow   Appearance Clear  Clear   WBC Esterase Negative  Negative   Protein Negative  Negative/Trace   Glucose Negative  Negative   Ketones Negative  Negative   Occult Blood Negative  Negative   Bilirubin Negative  Negative   Urobilinogen,Semi-Qn 1 0 EU/dL  0 2-1 0   Nitrite, Urine Negative  Negative   Microscopic Examination Comment     Microscopic follows if indicated  (1) VITAMIN D 25-HYDROXY [id number] 12:00AM [facility]     Test Name Result Flag Reference   Vitamin D, 25-Hydroxy 24 3 ng/mL L 30 0-100 0   Vitamin D deficiency has been defined by the [address] practice guideline as a  level of serum 25-OH vitamin D less than 20 ng/mL (1,2)  The Endocrine Society went on to further define vitamin D  insufficiency as a level between 21 and 29 ng/mL (2)  1  IOM ([place] [employer])  2010  Dietary reference     intakes for calcium and D  [facility]: The     [employer]  2  Lashay MF, Symone NC, Davie STEWART, et al      Evaluation, treatment, and prevention of vitamin D     deficiency: an Endocrine Society clinical practice     guideline  JCEM  2011 Jul; 96(7):1911-30  (1) VITAMIN B12 [id number] 12:00AM [employer]     Test Name Result Flag Reference   Vitamin B12 536 pg/mL  211-946     (1) FOLATE [id number] 12:00AM [facility]     Test Name Result Flag Reference   Folate (Folic Acid), Serum 7 2 ng/mL  >3 0   A serum folate concentration of less than 3 1 ng/mL is  considered to represent clinical deficiency       (1) MAGNESIUM [id number] 12:00AM [facility] [employer]     Test Name Result Flag Reference   Magnesium, Serum 2 1 mg/dL 1 6-2 3     (1) PHOSPHORUS [id number] 12:00AM Bosie Po     Test Name Result Flag Reference   Phosphorus, Serum 3 8 mg/dL  2 5-4 5     (LC) Sedimentation Rate-Westergren [id number] 12:00AM [facility]     Test Name Result Flag Reference   Sedimentation Rate-Westergren 29 mm/hr  0-30     (LC) Rheumatoid Arthritis Factor [id number] 12:00AM [facility]     Test Name Result Flag Reference   RA Latex Turbid  9 0 IU/mL  0 0-13 9     [facility]) Cardiovascular Risk Assessment [id number] 12:00AM [facility]     Test Name Result Flag Reference   Interpretation Note     Supplement report is available  PDF Image   [facility]) ABIODUN w/Reflex [id number] 12:00AM [facility]     Test Name Result Flag Reference   ABIODUN Direct Negative  Negative     ([facility] [facility]) Lyme Ab/Western Blot Reflex [id number] 12:00AM [facility] Po     Test Name Result Flag Reference   Lyme IgG/IgM Ab <0 91 ISR  0 00-0 90   Negative         <0 91                                                 Equivocal  0 91 - 1 09                                                 Positive         >1 09   Lyme Disease Ab, Quant, IgM 1 05 index H 0 00-0 79   Negative         <0 80                                                 Equivocal  0 80 - 1 19                                                 Positive         >1 19                  IgM levels may peak at 3-6 weeks post infection, then                  gradually decline  IgG P93 Ab  Absent     IgG P66 Ab  Absent     IgG P58 Ab  Present A    IgG P45 Ab  Absent     IgG P41 Ab  Absent     IgG P39 Ab  Absent     IgG P30 Ab  Present A    IgG P28 Ab  Absent     IgG P23 Ab  Absent     IgG P18 Ab  Absent     Lyme IgG WB Interp  Negative     Positive: 5 of the following                                                Borrelia-specific bands:                                                18,23,28,30,39,41,45,58,                                                66, and 93                                        Negative: No bands or banding                                                patterns which do not                                                meet positive criteria  IgM P41 Ab  Absent     IgM P39 Ab  Absent     IgM P23 Ab  Absent     Lyme IgM WB Interp  Negative     Note: An equivocal or positive EIA result followed by a negative  Western Blot result is considered NEGATIVE  An equivocal or positive  EIA result followed by a positive Western Blot is considered POSITIVE  by the CDC  Positive: 2 of the following bands: 23,39 or 41  Negative: No bands or banding patterns which do not meet positive  criteria  Criteria for positivity are those recommended by CDC/ASTPHLD   p23=Osp C, s46=[id number]  Note:  Sera from individuals with the following may cross react in the  Lyme Western Blot assays: other spirochetal diseases (periodontal  disease, leptospirosis, relapsing fever, yaws, and pinta);  connective autoimmune (Rheumatoid Arthritis and Systemic Lupus  Erythematosus and also individuals with Antinuclear Antibody);  other infections [facility] Spotted Fever; Graham-Barr Virus,  and Cytomegalovirus)  ([facility] [facility]) EBV Acute Infection Antibodies [id number] 12:00AM Niraj Longoria     Test Name Result Flag Reference   EBV Ab VCA, IgM 39 2 U/mL H 0 0-35 9   A second sample should be collected and tested no less than 2-4 weeks  Negative        <36 0                                                  Equivocal 36 0 - 43 9                                                  Positive        >43 9   EBV Early Antigen Ab, IgG 39 9 U/mL H 0 0-8 9   Hepatitis A, Hepatitis C and HIV antibodies may cross-react  with this assay                                                    Negative        < 9 0                                                  Equivocal  9 0 - 10 9                                                  Positive        >10 9   EBV Ab VCA, IgG >600 0 U/mL H 0 0-17 9   Negative        <18 0 Equivocal 18 0 - 21 9                                                  Positive        >21 9   EBV Nuclear Antigen Ab, IgG >600 0 U/mL H 0 0-17 9   Negative        <18 0                                                  Equivocal 18 0 - 21 9                                                  Positive        >21 9   Interpretation: Comment     EBV Interpretation Chart                 Interpretation   EBV-IgM  EA(D)-IgG  VCA-IgG  EBNA-IgG                 EBV Seronegative    -        -         -          -                 Early Phase         +        -         -          -                 Acute Primary       +       +or-       +          -                 Infection                 Convalescence/Past  -       +or-       +          +                 Infection                 Reactivated        +or-      +         +          +                 Infection                        + Antibody Present      - Antibody Absent

## 2018-01-12 NOTE — MISCELLANEOUS
Message  Return to work or school:   Kamron Briscoe is under my professional care  He was seen in my office on 2/3/2017   He is able to return to work on  2/8/2017       Dr Imelda Nova        Signatures   Electronically signed by : Malcolm Kapoor, ; Feb 7 2017  4:46PM EST                       (Author)

## 2018-01-13 VITALS
TEMPERATURE: 98.1 F | WEIGHT: 222 LBS | SYSTOLIC BLOOD PRESSURE: 118 MMHG | BODY MASS INDEX: 33.65 KG/M2 | DIASTOLIC BLOOD PRESSURE: 72 MMHG | RESPIRATION RATE: 16 BRPM | HEART RATE: 72 BPM | HEIGHT: 68 IN

## 2018-01-13 NOTE — RESULT NOTES
Message   please call patient  titers consistent now with past infection  rl     Verified Results  ([facility] [facility]) EBV Acute Infection Antibodies [id number] 12:00AM Sunday Weiss     Test Name Result Flag Reference   EBV Ab VCA, IgM <36 0 U/mL  0 0-35 9   Negative        <36 0                                                  Equivocal 36 0 - 43 9                                                  Positive        >43 9   EBV Early Antigen Ab, IgG 83 6 U/mL H 0 0-8 9   Hepatitis A, Hepatitis C and HIV antibodies may cross-react  with this assay                                                    Negative        < 9 0                                                  Equivocal  9 0 - 10 9                                                  Positive        >10 9   EBV Ab VCA, IgG >600 0 U/mL H 0 0-17 9   Negative        <18 0                                                  Equivocal 18 0 - 21 9                                                  Positive        >21 9   EBV Nuclear Antigen Ab, IgG >600 0 U/mL H 0 0-17 9   Negative        <18 0                                                  Equivocal 18 0 - 21 9                                                  Positive        >21 9   Interpretation: Comment     EBV Interpretation Chart                 Interpretation   EBV-IgM  EA(D)-IgG  VCA-IgG  EBNA-IgG                 EBV Seronegative    -        -         -          -                 Early Phase         +        -         -          -                 Acute Primary       +       +or-       +          -                 Infection                 Convalescence/Past  -       +or-       +          +                 Infection                 Reactivated        +or-      +         +          +                 Infection                        + Antibody Present      - Antibody Absent

## 2018-01-14 VITALS
DIASTOLIC BLOOD PRESSURE: 60 MMHG | RESPIRATION RATE: 16 BRPM | BODY MASS INDEX: 32.13 KG/M2 | TEMPERATURE: 97.8 F | HEART RATE: 60 BPM | HEIGHT: 68 IN | WEIGHT: 212 LBS | SYSTOLIC BLOOD PRESSURE: 100 MMHG

## 2018-01-14 VITALS
WEIGHT: 212 LBS | RESPIRATION RATE: 16 BRPM | HEIGHT: 68 IN | DIASTOLIC BLOOD PRESSURE: 62 MMHG | BODY MASS INDEX: 32.13 KG/M2 | SYSTOLIC BLOOD PRESSURE: 118 MMHG | TEMPERATURE: 98.6 F

## 2018-01-22 VITALS
TEMPERATURE: 97.4 F | BODY MASS INDEX: 33.71 KG/M2 | DIASTOLIC BLOOD PRESSURE: 78 MMHG | RESPIRATION RATE: 16 BRPM | SYSTOLIC BLOOD PRESSURE: 122 MMHG | HEIGHT: 68 IN | HEART RATE: 68 BPM | WEIGHT: 222.4 LBS

## 2018-01-22 VITALS
WEIGHT: 221.4 LBS | RESPIRATION RATE: 16 BRPM | TEMPERATURE: 97.7 F | DIASTOLIC BLOOD PRESSURE: 74 MMHG | HEIGHT: 68 IN | SYSTOLIC BLOOD PRESSURE: 120 MMHG | BODY MASS INDEX: 33.56 KG/M2 | HEART RATE: 70 BPM

## 2018-02-26 DIAGNOSIS — L30.9 ACUTE DERMATITIS: Primary | ICD-10-CM

## 2018-02-26 RX ORDER — CLOTRIMAZOLE AND BETAMETHASONE DIPROPIONATE 10; .64 MG/G; MG/G
CREAM TOPICAL 2 TIMES DAILY
Qty: 30 G | Refills: 0 | Status: SHIPPED | OUTPATIENT
Start: 2018-02-26 | End: 2018-03-10 | Stop reason: SDUPTHER

## 2018-03-06 ENCOUNTER — APPOINTMENT (OUTPATIENT)
Dept: RADIOLOGY | Facility: CLINIC | Age: 53
End: 2018-03-06
Payer: COMMERCIAL

## 2018-03-06 ENCOUNTER — TRANSCRIBE ORDERS (OUTPATIENT)
Dept: RADIOLOGY | Facility: CLINIC | Age: 53
End: 2018-03-06

## 2018-03-06 ENCOUNTER — TELEPHONE (OUTPATIENT)
Dept: FAMILY MEDICINE CLINIC | Facility: CLINIC | Age: 53
End: 2018-03-06

## 2018-03-06 DIAGNOSIS — M54.12 BRACHIAL NEURITIS: ICD-10-CM

## 2018-03-06 DIAGNOSIS — M54.12 BRACHIAL NEURITIS: Primary | ICD-10-CM

## 2018-03-06 PROBLEM — N40.0 BPH (BENIGN PROSTATIC HYPERPLASIA): Status: ACTIVE | Noted: 2017-10-12

## 2018-03-06 PROBLEM — K21.9 CHRONIC GERD: Status: ACTIVE | Noted: 2017-11-01

## 2018-03-06 PROBLEM — M25.511 ACUTE PAIN OF RIGHT SHOULDER: Status: ACTIVE | Noted: 2017-02-03

## 2018-03-06 PROBLEM — K58.9 IBS (IRRITABLE BOWEL SYNDROME): Status: ACTIVE | Noted: 2017-10-12

## 2018-03-06 PROCEDURE — 71046 X-RAY EXAM CHEST 2 VIEWS: CPT

## 2018-03-06 RX ORDER — ALPRAZOLAM 0.25 MG/1
1 TABLET ORAL
COMMUNITY
Start: 2017-01-12 | End: 2022-01-13 | Stop reason: ALTCHOICE

## 2018-03-06 RX ORDER — CHLORDIAZEPOXIDE HYDROCHLORIDE AND CLIDINIUM BROMIDE 5; 2.5 MG/1; MG/1
1 CAPSULE ORAL EVERY 12 HOURS
COMMUNITY
Start: 2017-10-12 | End: 2018-03-08

## 2018-03-06 RX ORDER — ASPIRIN 81 MG/1
TABLET ORAL
COMMUNITY

## 2018-03-06 RX ORDER — DILTIAZEM HYDROCHLORIDE 360 MG/1
CAPSULE, EXTENDED RELEASE ORAL
COMMUNITY
Start: 2015-02-20 | End: 2018-03-08

## 2018-03-08 ENCOUNTER — OFFICE VISIT (OUTPATIENT)
Dept: FAMILY MEDICINE CLINIC | Facility: CLINIC | Age: 53
End: 2018-03-08
Payer: COMMERCIAL

## 2018-03-08 VITALS
DIASTOLIC BLOOD PRESSURE: 70 MMHG | BODY MASS INDEX: 34.37 KG/M2 | HEART RATE: 72 BPM | HEIGHT: 67 IN | TEMPERATURE: 98.5 F | WEIGHT: 219 LBS | RESPIRATION RATE: 16 BRPM | SYSTOLIC BLOOD PRESSURE: 122 MMHG

## 2018-03-08 DIAGNOSIS — M54.12 CERVICAL RADICULOPATHY: ICD-10-CM

## 2018-03-08 DIAGNOSIS — Z01.818 PRE-OP EXAMINATION: Primary | ICD-10-CM

## 2018-03-08 DIAGNOSIS — I48.19 PERSISTENT ATRIAL FIBRILLATION (HCC): ICD-10-CM

## 2018-03-08 PROCEDURE — 3008F BODY MASS INDEX DOCD: CPT | Performed by: FAMILY MEDICINE

## 2018-03-08 PROCEDURE — 99203 OFFICE O/P NEW LOW 30 MIN: CPT | Performed by: FAMILY MEDICINE

## 2018-03-08 PROCEDURE — 93000 ELECTROCARDIOGRAM COMPLETE: CPT | Performed by: FAMILY MEDICINE

## 2018-03-08 RX ORDER — GABAPENTIN 100 MG/1
CAPSULE ORAL
COMMUNITY
Start: 2017-12-27 | End: 2018-09-04 | Stop reason: ALTCHOICE

## 2018-03-08 RX ORDER — APIXABAN 5 MG/1
TABLET, FILM COATED ORAL
COMMUNITY
Start: 2018-01-09 | End: 2018-03-08

## 2018-03-08 RX ORDER — METOPROLOL SUCCINATE 50 MG/1
TABLET, EXTENDED RELEASE ORAL
COMMUNITY
Start: 2018-02-11

## 2018-03-08 RX ORDER — TADALAFIL 5 MG
TABLET ORAL
COMMUNITY
Start: 2018-02-19 | End: 2018-05-16 | Stop reason: SDUPTHER

## 2018-03-08 NOTE — PATIENT INSTRUCTIONS
Please call cardiologist since you will need clearance from him prior to surgery due to persistent afib

## 2018-03-08 NOTE — PROGRESS NOTES
Chief Complaint   Patient presents with    Pre-op Exam     spinal surgery 3/9/2017 with Dr Surinder Jimenez        Patient ID: Larena Schirmer is a 45 y o  male  Pt seen and examined  Here for preop for cervical neck surgery- anterior cervical fusion by Dr Surinder Jimenez on 3/9/18  Brought in script that just says cervical neck surgery-- doesn't stated the type of anesthesia  Office call who stated pt is going under general anesthesia   Pt had bw done   Pt had cxr done  EKG wasn't done and pt has afib  Pt was not asked to follow up with cardiology prior to surgery   Pt recently saw his cardiologist within the past month     Pt's medical, surgical, family hx reviewed    Has had anesthesia in the past without incident    Is on asa for anticoag  The following portions of the patient's history were reviewed and updated as appropriate: allergies, current medications, past family history, past medical history, past social history, past surgical history and problem list     Review of Systems   Constitutional: Negative for activity change, appetite change and fatigue  Respiratory: Negative for cough and chest tightness  Cardiovascular: Negative for chest pain, palpitations and leg swelling  Gastrointestinal: Negative for abdominal pain, constipation, diarrhea, nausea and vomiting  Genitourinary: Negative for difficulty urinating  Musculoskeletal: Positive for myalgias and neck pain  Negative for arthralgias  Neurological: Positive for numbness  Negative for dizziness, weakness and headaches  Hematological: Negative for adenopathy  Psychiatric/Behavioral: Negative for behavioral problems  The patient is not nervous/anxious            Current Outpatient Prescriptions   Medication Sig Dispense Refill    ALPRAZolam (XANAX) 0 25 mg tablet Take 1 tablet by mouth      aspirin (ECOTRIN LOW STRENGTH) 81 mg EC tablet Take by mouth      CIALIS 5 MG tablet       clotrimazole-betamethasone (LOTRISONE) 1-0 05 % cream Apply topically 2 (two) times a day 30 g 0    diltiazem (CARDIZEM CD) 360 MG 24 hr capsule Take 360 mg by mouth daily   dronedarone (MULTAQ) 400 mg tablet Take by mouth      metoprolol succinate (TOPROL-XL) 50 mg 24 hr tablet       oxyCODONE-acetaminophen (PERCOCET) 5-325 mg per tablet Take 1 tablet by mouth every 8 (eight) hours as needed for moderate pain      pantoprazole (PROTONIX) 40 mg tablet Take 40 mg by mouth daily      gabapentin (NEURONTIN) 100 mg capsule        No current facility-administered medications for this visit  Objective:    /70 (BP Location: Left arm, Patient Position: Sitting, Cuff Size: Standard)   Pulse 72   Temp 98 5 °F (36 9 °C)   Resp 16   Ht 5' 7" (1 702 m)   Wt 99 3 kg (219 lb)   BMI 34 30 kg/m²        Physical Exam   Constitutional: He is oriented to person, place, and time  He appears well-developed and well-nourished  HENT:   Mouth/Throat: Uvula is midline, oropharynx is clear and moist and mucous membranes are normal    Eyes: Conjunctivae are normal  Pupils are equal, round, and reactive to light  Neck: Normal range of motion  Carotid bruit is not present  No thyromegaly present  Cardiovascular: An irregularly irregular rhythm present  Pulmonary/Chest: Effort normal and breath sounds normal    Abdominal: Soft  Musculoskeletal: He exhibits no edema  Neurological: He is alert and oriented to person, place, and time  Psychiatric: He has a normal mood and affect  EKG shows afib  Labs reviewed from outside source  Xr Chest Pa & Lateral    Result Date: 3/6/2018  Impression No acute cardiopulmonary disease  Workstation performed: [id number]     Assessment/Plan:    No problem-specific Assessment & Plan notes found for this encounter  Diagnoses and all orders for this visit:    Pre-op examination  Comments:  pt will need cardiac clearance prior to surgery due to persistent atrial fibrillation        Cervical radiculopathy  Comments:  pt scheduled for surgery but will need cardiac clearance first    Persistent atrial fibrillation (HCC)  Comments:  currently in afib  Orders:  -     POCT ECG          Unfortunately, will need cardiac clearance for surgery due to persistent afib  Pt is aware and will contact his cardiologist          No Follow-up on file            Christine Fink DO

## 2018-03-08 NOTE — LETTER
March 8, 2018     Tala Phan, [address] [facility]    Patient: Raman Betancourt   YOB: 1965   Date of Visit: 3/8/2018       Dear Dr Sukhjinder Francois: Thank you for referring Karthik Salmeron to me for evaluation  Below are my notes for this consultation  If you have questions, please do not hesitate to call me  I look forward to following your patient along with you  Sincerely,        Palmira Nicholson DO        CC: No Recipients  Palmira Nicholson DO  3/8/2018  2:12 PM  Sign at close encounter  Chief Complaint   Patient presents with    Pre-op Exam     spinal surgery 3/9/2017 with Dr Sukhjinder Francois        Patient ID: Raman Betancourt is a 45 y o  male  Pt seen and examined  Here for preop for cervical neck surgery- anterior cervical fusion by Dr Sukhjinder Francois on 3/9/18  Brought in script that just says cervical neck surgery-- doesn't stated the type of anesthesia  Office call who stated pt is going under general anesthesia   Pt had bw done   Pt had cxr done  EKG wasn't done and pt has afib  Pt was not asked to follow up with cardiology prior to surgery   Pt recently saw his cardiologist within the past month     Pt's medical, surgical, family hx reviewed    Has had anesthesia in the past without incident    Is on asa for anticoag  The following portions of the patient's history were reviewed and updated as appropriate: allergies, current medications, past family history, past medical history, past social history, past surgical history and problem list     Review of Systems   Constitutional: Negative for activity change, appetite change and fatigue  Respiratory: Negative for cough and chest tightness  Cardiovascular: Negative for chest pain, palpitations and leg swelling  Gastrointestinal: Negative for abdominal pain, constipation, diarrhea, nausea and vomiting  Genitourinary: Negative for difficulty urinating  Musculoskeletal: Positive for myalgias and neck pain  Negative for arthralgias  Neurological: Positive for numbness  Negative for dizziness, weakness and headaches  Hematological: Negative for adenopathy  Psychiatric/Behavioral: Negative for behavioral problems  The patient is not nervous/anxious  Current Outpatient Prescriptions   Medication Sig Dispense Refill    ALPRAZolam (XANAX) 0 25 mg tablet Take 1 tablet by mouth      aspirin (ECOTRIN LOW STRENGTH) 81 mg EC tablet Take by mouth      CIALIS 5 MG tablet       clotrimazole-betamethasone (LOTRISONE) 1-0 05 % cream Apply topically 2 (two) times a day 30 g 0    diltiazem (CARDIZEM CD) 360 MG 24 hr capsule Take 360 mg by mouth daily   dronedarone (MULTAQ) 400 mg tablet Take by mouth      metoprolol succinate (TOPROL-XL) 50 mg 24 hr tablet       oxyCODONE-acetaminophen (PERCOCET) 5-325 mg per tablet Take 1 tablet by mouth every 8 (eight) hours as needed for moderate pain      pantoprazole (PROTONIX) 40 mg tablet Take 40 mg by mouth daily      gabapentin (NEURONTIN) 100 mg capsule        No current facility-administered medications for this visit  Objective:    /70 (BP Location: Left arm, Patient Position: Sitting, Cuff Size: Standard)   Pulse 72   Temp 98 5 °F (36 9 °C)   Resp 16   Ht 5' 7" (1 702 m)   Wt 99 3 kg (219 lb)   BMI 34 30 kg/m²         Physical Exam   Constitutional: He is oriented to person, place, and time  He appears well-developed and well-nourished  HENT:   Mouth/Throat: Uvula is midline, oropharynx is clear and moist and mucous membranes are normal    Eyes: Conjunctivae are normal  Pupils are equal, round, and reactive to light  Neck: Normal range of motion  Carotid bruit is not present  No thyromegaly present  Cardiovascular: An irregularly irregular rhythm present  Pulmonary/Chest: Effort normal and breath sounds normal    Abdominal: Soft  Musculoskeletal: He exhibits no edema     Neurological: He is alert and oriented to person, place, and time    Psychiatric: He has a normal mood and affect  EKG shows afib  Labs reviewed from outside source  Xr Chest Pa & Lateral    Result Date: 3/6/2018  Impression No acute cardiopulmonary disease  Workstation performed: [id number]     Assessment/Plan:    No problem-specific Assessment & Plan notes found for this encounter  Diagnoses and all orders for this visit:    Pre-op examination  Comments:  pt will need cardiac clearance prior to surgery due to persistent atrial fibrillation  Cervical radiculopathy  Comments:  pt scheduled for surgery but will need cardiac clearance first    Persistent atrial fibrillation (HCC)  Comments:  currently in afib  Orders:  -     POCT ECG          Unfortunately, will need cardiac clearance for surgery due to persistent afib  Pt is aware and will contact his cardiologist          No Follow-up on file            Edd Alcocer DO

## 2018-03-10 DIAGNOSIS — L30.9 ACUTE DERMATITIS: ICD-10-CM

## 2018-03-12 RX ORDER — CLOTRIMAZOLE AND BETAMETHASONE DIPROPIONATE 10; .64 MG/G; MG/G
CREAM TOPICAL
Qty: 45 G | Refills: 6 | Status: SHIPPED | OUTPATIENT
Start: 2018-03-12 | End: 2022-01-13 | Stop reason: ALTCHOICE

## 2018-05-09 ENCOUNTER — TRANSCRIBE ORDERS (OUTPATIENT)
Dept: RADIOLOGY | Facility: CLINIC | Age: 53
End: 2018-05-09

## 2018-05-09 ENCOUNTER — APPOINTMENT (OUTPATIENT)
Dept: RADIOLOGY | Facility: CLINIC | Age: 53
End: 2018-05-09
Payer: COMMERCIAL

## 2018-05-09 DIAGNOSIS — M54.12 BRACHIAL NEURITIS: Primary | ICD-10-CM

## 2018-05-09 DIAGNOSIS — M54.12 BRACHIAL NEURITIS: ICD-10-CM

## 2018-05-09 PROCEDURE — 72040 X-RAY EXAM NECK SPINE 2-3 VW: CPT

## 2018-05-16 DIAGNOSIS — N52.9 ERECTILE DYSFUNCTION, UNSPECIFIED ERECTILE DYSFUNCTION TYPE: Primary | ICD-10-CM

## 2018-05-16 RX ORDER — TADALAFIL 5 MG
TABLET ORAL
Qty: 30 TABLET | Refills: 6 | Status: SHIPPED | OUTPATIENT
Start: 2018-05-16 | End: 2018-07-26 | Stop reason: SDUPTHER

## 2018-07-02 ENCOUNTER — TELEPHONE (OUTPATIENT)
Dept: PAIN MEDICINE | Facility: CLINIC | Age: 53
End: 2018-07-02

## 2018-07-02 NOTE — TELEPHONE ENCOUNTER
Dr Alberta Bruno received a phone call from Dr Dannielle Pryor asking if he would take patient on  Pt has seen Dr Juan Geronimo, [address], [address] [place] [address], neck surgery in March, Percocet 10mg 4 x's a day  Dr Alberta Bruno is requesting PM records  Thank you

## 2018-07-03 NOTE — TELEPHONE ENCOUNTER
Lm for patient to call back   Please advise patient we need prior pain records from Dr Sissy Stallings office per Dr Renee Lama request

## 2018-07-23 NOTE — TELEPHONE ENCOUNTER
lmom for pt to cb to and update us on status of prior pain records  If pt is having a hard time getting them please make him aware he can come and sign one of our medical record releases and we can fax it for him  Thank you

## 2018-07-26 ENCOUNTER — TELEPHONE (OUTPATIENT)
Dept: FAMILY MEDICINE CLINIC | Facility: CLINIC | Age: 53
End: 2018-07-26

## 2018-07-26 DIAGNOSIS — N52.9 ERECTILE DYSFUNCTION, UNSPECIFIED ERECTILE DYSFUNCTION TYPE: ICD-10-CM

## 2018-07-26 RX ORDER — TADALAFIL 5 MG/1
5 TABLET ORAL DAILY
Qty: 15 TABLET | Refills: 0 | Status: SHIPPED | OUTPATIENT
Start: 2018-07-26 | End: 2018-07-30 | Stop reason: SDUPTHER

## 2018-07-26 NOTE — TELEPHONE ENCOUNTER
Patient's wife Melody Levin called, her phone# [phone]  She says Hussainte received request for 15 pills for ciallis but prior auth is needed as [employer] will only allow 7 pills per month unless it is authorized that he needs to take 1 pill every day  She will have  call back to schedule appointment

## 2018-07-30 ENCOUNTER — TELEPHONE (OUTPATIENT)
Dept: FAMILY MEDICINE CLINIC | Facility: CLINIC | Age: 53
End: 2018-07-30

## 2018-07-30 DIAGNOSIS — N52.9 ERECTILE DYSFUNCTION, UNSPECIFIED ERECTILE DYSFUNCTION TYPE: ICD-10-CM

## 2018-07-30 RX ORDER — TADALAFIL 5 MG/1
5 TABLET ORAL DAILY PRN
Qty: 6 TABLET | Refills: 0 | Status: SHIPPED | OUTPATIENT
Start: 2018-07-30

## 2018-07-30 NOTE — TELEPHONE ENCOUNTER
Insurance will not cover Cialis 5mg daily  They will only pay for 6 pills in a 30 day period   Will not do a prior [facility]

## 2018-07-30 NOTE — TELEPHONE ENCOUNTER
The patients wife came in and said that all we have to do here at the office is call and state that he has been taking this medication daily for years because of a medical condition and she said that if we call, they will approve it  Please call patient back to clarify if needed  She said that we have to tell the insurance company that we approve of him taking this every day

## 2018-07-31 NOTE — TELEPHONE ENCOUNTER
Left message for patient to call office  Insurance was informed that patient takes the medication daily   [facility]

## 2018-08-21 NOTE — TELEPHONE ENCOUNTER
lmom for patient to call back to update us on status of prior pain records       Also reminded him if he is having a tough time he can also come to one of our offices to sign a release

## 2018-09-04 ENCOUNTER — APPOINTMENT (EMERGENCY)
Dept: RADIOLOGY | Facility: HOSPITAL | Age: 53
End: 2018-09-04
Payer: COMMERCIAL

## 2018-09-04 ENCOUNTER — HOSPITAL ENCOUNTER (EMERGENCY)
Facility: HOSPITAL | Age: 53
Discharge: HOME | End: 2018-09-04
Attending: EMERGENCY MEDICINE | Admitting: EMERGENCY MEDICINE
Payer: COMMERCIAL

## 2018-09-04 VITALS
OXYGEN SATURATION: 98 % | TEMPERATURE: 97.1 F | DIASTOLIC BLOOD PRESSURE: 55 MMHG | HEART RATE: 68 BPM | SYSTOLIC BLOOD PRESSURE: 107 MMHG | RESPIRATION RATE: 16 BRPM

## 2018-09-04 DIAGNOSIS — R42 VERTIGO: Primary | ICD-10-CM

## 2018-09-04 LAB
ALBUMIN SERPL BCP-MCNC: 3.4 G/DL (ref 3.5–5)
ALP SERPL-CCNC: 86 U/L (ref 46–116)
ALT SERPL W P-5'-P-CCNC: 20 U/L (ref 12–78)
ANION GAP SERPL CALCULATED.3IONS-SCNC: 7 MMOL/L (ref 4–13)
AST SERPL W P-5'-P-CCNC: 21 U/L (ref 5–45)
ATRIAL RATE: 73 BPM
BASOPHILS # BLD AUTO: 0.05 THOUSANDS/ΜL (ref 0–0.1)
BASOPHILS NFR BLD AUTO: 1 % (ref 0–1)
BILIRUB SERPL-MCNC: 0.7 MG/DL (ref 0.2–1)
BUN SERPL-MCNC: 12 MG/DL (ref 5–25)
CALCIUM SERPL-MCNC: 8.4 MG/DL (ref 8.3–10.1)
CHLORIDE SERPL-SCNC: 105 MMOL/L (ref 100–108)
CO2 SERPL-SCNC: 26 MMOL/L (ref 21–32)
CREAT SERPL-MCNC: 0.96 MG/DL (ref 0.6–1.3)
EOSINOPHIL # BLD AUTO: 0.27 THOUSAND/ΜL (ref 0–0.61)
EOSINOPHIL NFR BLD AUTO: 4 % (ref 0–6)
ERYTHROCYTE [DISTWIDTH] IN BLOOD BY AUTOMATED COUNT: 12.8 % (ref 11.6–15.1)
GFR SERPL CREATININE-BSD FRML MDRD: 89 ML/MIN/1.73SQ M
GLUCOSE SERPL-MCNC: 110 MG/DL (ref 65–140)
HCT VFR BLD AUTO: 38.6 % (ref 36.5–49.3)
HGB BLD-MCNC: 12.6 G/DL (ref 12–17)
IMM GRANULOCYTES # BLD AUTO: 0.02 THOUSAND/UL (ref 0–0.2)
IMM GRANULOCYTES NFR BLD AUTO: 0 % (ref 0–2)
LIPASE SERPL-CCNC: 90 U/L (ref 73–393)
LYMPHOCYTES # BLD AUTO: 1.05 THOUSANDS/ΜL (ref 0.6–4.47)
LYMPHOCYTES NFR BLD AUTO: 17 % (ref 14–44)
MCH RBC QN AUTO: 30.4 PG (ref 26.8–34.3)
MCHC RBC AUTO-ENTMCNC: 32.6 G/DL (ref 31.4–37.4)
MCV RBC AUTO: 93 FL (ref 82–98)
MONOCYTES # BLD AUTO: 0.54 THOUSAND/ΜL (ref 0.17–1.22)
MONOCYTES NFR BLD AUTO: 9 % (ref 4–12)
NEUTROPHILS # BLD AUTO: 4.25 THOUSANDS/ΜL (ref 1.85–7.62)
NEUTS SEG NFR BLD AUTO: 69 % (ref 43–75)
NRBC BLD AUTO-RTO: 0 /100 WBCS
PLATELET # BLD AUTO: 223 THOUSANDS/UL (ref 149–390)
PMV BLD AUTO: 8.9 FL (ref 8.9–12.7)
POTASSIUM SERPL-SCNC: 3.2 MMOL/L (ref 3.5–5.3)
PROT SERPL-MCNC: 6.4 G/DL (ref 6.4–8.2)
QRS AXIS: 2 DEGREES
QRSD INTERVAL: 92 MS
QT INTERVAL: 422 MS
QTC INTERVAL: 471 MS
RBC # BLD AUTO: 4.14 MILLION/UL (ref 3.88–5.62)
SODIUM SERPL-SCNC: 138 MMOL/L (ref 136–145)
T WAVE AXIS: 0 DEGREES
VENTRICULAR RATE: 75 BPM
WBC # BLD AUTO: 6.18 THOUSAND/UL (ref 4.31–10.16)

## 2018-09-04 PROCEDURE — 93010 ELECTROCARDIOGRAM REPORT: CPT | Performed by: INTERNAL MEDICINE

## 2018-09-04 PROCEDURE — 80053 COMPREHEN METABOLIC PANEL: CPT | Performed by: EMERGENCY MEDICINE

## 2018-09-04 PROCEDURE — 85025 COMPLETE CBC W/AUTO DIFF WBC: CPT | Performed by: EMERGENCY MEDICINE

## 2018-09-04 PROCEDURE — 96361 HYDRATE IV INFUSION ADD-ON: CPT

## 2018-09-04 PROCEDURE — 96375 TX/PRO/DX INJ NEW DRUG ADDON: CPT

## 2018-09-04 PROCEDURE — 70450 CT HEAD/BRAIN W/O DYE: CPT

## 2018-09-04 PROCEDURE — 83690 ASSAY OF LIPASE: CPT | Performed by: EMERGENCY MEDICINE

## 2018-09-04 PROCEDURE — 36415 COLL VENOUS BLD VENIPUNCTURE: CPT | Performed by: EMERGENCY MEDICINE

## 2018-09-04 PROCEDURE — 93005 ELECTROCARDIOGRAM TRACING: CPT

## 2018-09-04 PROCEDURE — 99284 EMERGENCY DEPT VISIT MOD MDM: CPT

## 2018-09-04 PROCEDURE — 96374 THER/PROPH/DIAG INJ IV PUSH: CPT

## 2018-09-04 RX ORDER — DIAZEPAM 5 MG/ML
2.5 INJECTION, SOLUTION INTRAMUSCULAR; INTRAVENOUS ONCE
Status: COMPLETED | OUTPATIENT
Start: 2018-09-04 | End: 2018-09-04

## 2018-09-04 RX ORDER — MECLIZINE HYDROCHLORIDE 25 MG/1
50 TABLET ORAL ONCE
Status: COMPLETED | OUTPATIENT
Start: 2018-09-04 | End: 2018-09-04

## 2018-09-04 RX ORDER — PROMETHAZINE HYDROCHLORIDE 25 MG/ML
25 INJECTION, SOLUTION INTRAMUSCULAR; INTRAVENOUS ONCE
Status: COMPLETED | OUTPATIENT
Start: 2018-09-04 | End: 2018-09-04

## 2018-09-04 RX ORDER — MECLIZINE HYDROCHLORIDE 25 MG/1
25 TABLET ORAL 3 TIMES DAILY PRN
Qty: 15 TABLET | Refills: 0 | Status: SHIPPED | OUTPATIENT
Start: 2018-09-04 | End: 2022-01-13 | Stop reason: ALTCHOICE

## 2018-09-04 RX ADMIN — Medication 2.5 MG: at 08:40

## 2018-09-04 RX ADMIN — SODIUM CHLORIDE 1000 ML: 0.9 INJECTION, SOLUTION INTRAVENOUS at 08:11

## 2018-09-04 RX ADMIN — PROMETHAZINE HYDROCHLORIDE 25 MG: 25 INJECTION INTRAMUSCULAR; INTRAVENOUS at 08:11

## 2018-09-04 RX ADMIN — MECLIZINE HYDROCHLORIDE 50 MG: 25 TABLET ORAL at 11:27

## 2018-09-04 NOTE — DISCHARGE INSTRUCTIONS
Dizziness   WHAT YOU NEED TO KNOW:   Dizziness is a feeling of being off balance or unsteady  Common causes of dizziness are an inner ear fluid imbalance or a lack of oxygen in your blood  Dizziness may be acute (lasts 3 days or less) or chronic (lasts longer than 3 days)  You may have dizzy spells that last from seconds to a few hours  DISCHARGE INSTRUCTIONS:   Return to the emergency department if:   · You have a headache and a stiff neck  · You have shaking chills and a fever  · You vomit over and over with no relief  · Your vomit or bowel movements are red or black  · You have pain in your chest, back, or abdomen  · You have numbness, especially in your face, arms, or legs  · You have trouble moving your arms or legs  · You are confused  Contact your healthcare provider if:   · You have a fever  · Your symptoms do not get better with treatment  · You have questions or concerns about your condition or care  Manage your symptoms:   · Do not drive  or operate heavy machinery when you are dizzy  · Get up slowly  from sitting or lying down  · Drink plenty of liquids  Liquids help prevent dehydration  Ask how much liquid to drink each day and which liquids are best for you  Follow up with your healthcare provider as directed:  Write down your questions so you remember to ask them during your visits  © 2017 [employer]  Information is for End User's use only and may not be sold, redistributed or otherwise used for commercial purposes  All illustrations and images included in CareNotes® are the copyrighted property of [employer]  or [employer]  The above information is an  only  It is not intended as medical advice for individual conditions or treatments  Talk to your doctor, nurse or pharmacist before following any medical regimen to see if it is safe and effective for you

## 2018-09-04 NOTE — ED PROVIDER NOTES
History  Chief Complaint   Patient presents with    Dizziness    Nausea     Patient presents for evaluation of dizziness  States he got up to use the bathroom this morning around 6 am and when he got back to bed and rolled over the room started spinning and he got nauseous  When he sat up the symptoms got worse  Symptoms worse with head movement  No headache  Denies and previous history  Back pain for a few days states he threw it out the other day  History provided by:  Patient   used: No    Dizziness   Associated symptoms: nausea and vomiting    Associated symptoms: no chest pain, no diarrhea, no headaches, no shortness of breath and no weakness    Nausea   The primary symptoms include nausea and vomiting  Primary symptoms do not include fever, abdominal pain or diarrhea  The illness is also significant for back pain  Prior to Admission Medications   Prescriptions Last Dose Informant Patient Reported? Taking? ALPRAZolam (XANAX) 0 25 mg tablet   Yes No   Sig: Take 1 tablet by mouth   aspirin (ECOTRIN LOW STRENGTH) 81 mg EC tablet   Yes No   Sig: Take by mouth   clotrimazole-betamethasone (LOTRISONE) 1-0 05 % cream   No No   Sig: APPLY SPARINGLY TO AFFECTED AREA(S) THREE TIMES A DAY   diltiazem (CARDIZEM CD) 360 MG 24 hr capsule   Yes No   Sig: Take 360 mg by mouth daily     dronedarone (MULTAQ) 400 mg tablet   Yes No   Sig: Take by mouth   gabapentin (NEURONTIN) 100 mg capsule   Yes No   metoprolol succinate (TOPROL-XL) 50 mg 24 hr tablet   Yes No   oxyCODONE-acetaminophen (PERCOCET) 5-325 mg per tablet  Self Yes No   Sig: Take 1 tablet by mouth every 8 (eight) hours as needed for moderate pain   pantoprazole (PROTONIX) 40 mg tablet   Yes No   Sig: Take 40 mg by mouth daily   tadalafil (CIALIS) 5 MG tablet   No No   Sig: Take 1 tablet (5 mg total) by mouth daily as needed for erectile dysfunction      Facility-Administered Medications: None       Past Medical History: Diagnosis Date    Atrial fibrillation (HCC)     Cervicalgia     ONSET [id number]    Graham Whyte infection     GERD (gastroesophageal reflux disease)     Hypersomnia with sleep apnea     RESOLVED [id number]       Past Surgical History:   Procedure Laterality Date    APPENDECTOMY      CHOLECYSTECTOMY      COLONOSCOPY  2009    KNEE SURGERY      LAPAROSCOPIC GASTRIC BANDING      gastric sleeve       Family History   Problem Relation Age of Onset    Hyperlipidemia Mother     Hypertension Mother     Coronary artery disease Father     Hypertension Father     Stroke Father      I have reviewed and agree with the history as documented  Social History   Substance Use Topics    Smoking status: Current Every Day Smoker     Packs/day: 1 00     Years: 20 00     Types: E-Cigarettes    Smokeless tobacco: Never Used    Alcohol use No        Review of Systems   Constitutional: Negative for fever  Respiratory: Negative for shortness of breath  Cardiovascular: Negative for chest pain  Gastrointestinal: Positive for nausea and vomiting  Negative for abdominal pain and diarrhea  Musculoskeletal: Positive for back pain  Neurological: Positive for dizziness  Negative for syncope, facial asymmetry, speech difficulty, weakness, numbness and headaches  All other systems reviewed and are negative  Physical Exam  Physical Exam   Constitutional: He is oriented to person, place, and time  No distress  HENT:   Mouth/Throat: Oropharynx is clear and moist    Eyes: EOM are normal  Pupils are equal, round, and reactive to light  Neck: Normal range of motion  Neck supple  Cardiovascular: Normal rate, regular rhythm and intact distal pulses  Pulmonary/Chest: Effort normal and breath sounds normal  No respiratory distress  Abdominal: Soft  There is no tenderness  Musculoskeletal: Normal range of motion  Neurological: He is alert and oriented to person, place, and time   No cranial nerve deficit or sensory deficit  He exhibits normal muscle tone  Coordination normal    Finger to nose wnl   Skin: Capillary refill takes less than 2 seconds  He is not diaphoretic  Nursing note and vitals reviewed  Vital Signs  ED Triage Vitals [09/04/18 0750]   Temperature Pulse Respirations BP SpO2   (!) 97 1 °F (36 2 °C) 80 18 -- --      Temp Source Heart Rate Source Patient Position - Orthostatic VS BP Location FiO2 (%)   Tympanic Monitor -- -- --      Pain Score       --           Vitals:    09/04/18 0750   Pulse: 80       Visual Acuity      ED Medications  Medications   meclizine (ANTIVERT) tablet 50 mg (not administered)   promethazine (PHENERGAN) injection 25 mg (not administered)   sodium chloride 0 9 % bolus 1,000 mL (not administered)       Diagnostic Studies  Results Reviewed     Procedure Component Value Units Date/Time    CBC and differential [[id number]]     Lab Status:  No result Specimen:  Blood     Comprehensive metabolic panel [[id number]]     Lab Status:  No result Specimen:  Blood     Lipase [[id number]]     Lab Status:  No result Specimen:  Blood                  CT head without contrast    (Results Pending)              Procedures  Procedures       Phone Contacts  ED Phone Contact    ED Course  ED Course as of Sep 04 1337   Tue Sep 04, 2018   1102 Re-evaluation    Patient appears better  Reports dizziness has improved and nausea is almost resolved  Discussed test results and CT scan with the patient  MDM  Number of Diagnoses or Management Options  Vertigo:   Diagnosis management comments: Pulse ox on RA indicating adequate oxygenation    On re-exam dizziness was resolving and nausea was almost gone  Patient clinically appeared more comfortable   Repeat neurological exam was unchanged and normal        Amount and/or Complexity of Data Reviewed  Clinical lab tests: ordered and reviewed  Tests in the radiology section of CPT®: ordered and reviewed  Decide to obtain previous medical records or to obtain history from someone other than the patient: yes  Review and summarize past medical records: yes  Independent visualization of images, tracings, or specimens: yes    Patient Progress  Patient progress: improved    CritCare Time    Disposition  Final diagnoses:   None     ED Disposition     None      Follow-up Information    None         Patient's Medications   Discharge Prescriptions    No medications on file     No discharge procedures on file      ED Provider  Electronically Signed by           Nara Toth DO  09/04/18 0571

## 2018-09-06 DIAGNOSIS — K21.9 GASTROESOPHAGEAL REFLUX DISEASE WITHOUT ESOPHAGITIS: Primary | ICD-10-CM

## 2018-09-06 RX ORDER — PANTOPRAZOLE SODIUM 40 MG/1
40 TABLET, DELAYED RELEASE ORAL DAILY
Qty: 30 TABLET | Refills: 0 | Status: SHIPPED | OUTPATIENT
Start: 2018-09-06 | End: 2018-09-24 | Stop reason: SDUPTHER

## 2018-09-24 DIAGNOSIS — K21.9 GASTROESOPHAGEAL REFLUX DISEASE WITHOUT ESOPHAGITIS: ICD-10-CM

## 2018-09-24 RX ORDER — PANTOPRAZOLE SODIUM 40 MG/1
40 TABLET, DELAYED RELEASE ORAL DAILY
Qty: 30 TABLET | Refills: 0 | Status: SHIPPED | OUTPATIENT
Start: 2018-09-24 | End: 2022-01-13 | Stop reason: ALTCHOICE

## 2018-12-12 DIAGNOSIS — K21.9 GASTROESOPHAGEAL REFLUX DISEASE WITHOUT ESOPHAGITIS: ICD-10-CM

## 2018-12-13 RX ORDER — PANTOPRAZOLE SODIUM 40 MG/1
TABLET, DELAYED RELEASE ORAL
Qty: 30 TABLET | Refills: 0 | OUTPATIENT
Start: 2018-12-13

## 2019-10-04 ENCOUNTER — HOSPITAL ENCOUNTER (OUTPATIENT)
Facility: HOSPITAL | Age: 54
Setting detail: OBSERVATION
Discharge: HOME | End: 2019-10-05
Attending: SURGERY | Admitting: SURGERY
Payer: COMMERCIAL

## 2019-10-04 ENCOUNTER — APPOINTMENT (EMERGENCY)
Dept: RADIOLOGY | Facility: HOSPITAL | Age: 54
End: 2019-10-04
Payer: COMMERCIAL

## 2019-10-04 ENCOUNTER — HOSPITAL ENCOUNTER (EMERGENCY)
Facility: HOSPITAL | Age: 54
End: 2019-10-04
Attending: EMERGENCY MEDICINE | Admitting: EMERGENCY MEDICINE
Payer: COMMERCIAL

## 2019-10-04 VITALS
BODY MASS INDEX: 36.43 KG/M2 | TEMPERATURE: 96.7 F | WEIGHT: 232.59 LBS | OXYGEN SATURATION: 96 % | DIASTOLIC BLOOD PRESSURE: 64 MMHG | RESPIRATION RATE: 19 BRPM | SYSTOLIC BLOOD PRESSURE: 141 MMHG | HEART RATE: 92 BPM

## 2019-10-04 DIAGNOSIS — S22.41XA CLOSED FRACTURE OF MULTIPLE RIBS OF RIGHT SIDE, INITIAL ENCOUNTER: Primary | ICD-10-CM
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
## 2019-10-04 DIAGNOSIS — S22.49XA MULTIPLE RIB FRACTURES: ICD-10-CM

## 2019-10-04 DIAGNOSIS — R52 INTRACTABLE PAIN: ICD-10-CM

## 2019-10-04 LAB
ALBUMIN SERPL BCP-MCNC: 3.7 G/DL (ref 3.5–5)
ALP SERPL-CCNC: 83 U/L (ref 46–116)
ALT SERPL W P-5'-P-CCNC: 20 U/L (ref 12–78)
ANION GAP SERPL CALCULATED.3IONS-SCNC: 7 MMOL/L (ref 4–13)
APTT PPP: 23 SECONDS (ref 23–37)
AST SERPL W P-5'-P-CCNC: 20 U/L (ref 5–45)
BASOPHILS # BLD AUTO: 0.06 THOUSANDS/ΜL (ref 0–0.1)
BASOPHILS NFR BLD AUTO: 1 % (ref 0–1)
BILIRUB SERPL-MCNC: 0.4 MG/DL (ref 0.2–1)
BUN SERPL-MCNC: 9 MG/DL (ref 5–25)
CALCIUM SERPL-MCNC: 9 MG/DL (ref 8.3–10.1)
CHLORIDE SERPL-SCNC: 103 MMOL/L (ref 100–108)
CO2 SERPL-SCNC: 29 MMOL/L (ref 21–32)
CREAT SERPL-MCNC: 1.2 MG/DL (ref 0.6–1.3)
EOSINOPHIL # BLD AUTO: 0.16 THOUSAND/ΜL (ref 0–0.61)
EOSINOPHIL NFR BLD AUTO: 2 % (ref 0–6)
ERYTHROCYTE [DISTWIDTH] IN BLOOD BY AUTOMATED COUNT: 12.7 % (ref 11.6–15.1)
GFR SERPL CREATININE-BSD FRML MDRD: 67 ML/MIN/1.73SQ M
GLUCOSE SERPL-MCNC: 125 MG/DL (ref 65–140)
HCT VFR BLD AUTO: 41.4 % (ref 36.5–49.3)
HGB BLD-MCNC: 13.7 G/DL (ref 12–17)
IMM GRANULOCYTES # BLD AUTO: 0.04 THOUSAND/UL (ref 0–0.2)
IMM GRANULOCYTES NFR BLD AUTO: 0 % (ref 0–2)
INR PPP: 1.01 (ref 0.91–1.09)
LYMPHOCYTES # BLD AUTO: 0.91 THOUSANDS/ΜL (ref 0.6–4.47)
LYMPHOCYTES NFR BLD AUTO: 10 % (ref 14–44)
MAGNESIUM SERPL-MCNC: 1.9 MG/DL (ref 1.6–2.6)
MCH RBC QN AUTO: 30.6 PG (ref 26.8–34.3)
MCHC RBC AUTO-ENTMCNC: 33.1 G/DL (ref 31.4–37.4)
MCV RBC AUTO: 93 FL (ref 82–98)
MONOCYTES # BLD AUTO: 0.69 THOUSAND/ΜL (ref 0.17–1.22)
MONOCYTES NFR BLD AUTO: 8 % (ref 4–12)
NEUTROPHILS # BLD AUTO: 7.39 THOUSANDS/ΜL (ref 1.85–7.62)
NEUTS SEG NFR BLD AUTO: 79 % (ref 43–75)
NRBC BLD AUTO-RTO: 0 /100 WBCS
PLATELET # BLD AUTO: 237 THOUSANDS/UL (ref 149–390)
PMV BLD AUTO: 9.2 FL (ref 8.9–12.7)
POTASSIUM SERPL-SCNC: 3.8 MMOL/L (ref 3.5–5.3)
PROT SERPL-MCNC: 6.8 G/DL (ref 6.4–8.2)
PROTHROMBIN TIME: 10.9 SECONDS (ref 9.8–12)
RBC # BLD AUTO: 4.47 MILLION/UL (ref 3.88–5.62)
SODIUM SERPL-SCNC: 139 MMOL/L (ref 136–145)
WBC # BLD AUTO: 9.25 THOUSAND/UL (ref 4.31–10.16)

## 2019-10-04 PROCEDURE — 85730 THROMBOPLASTIN TIME PARTIAL: CPT | Performed by: EMERGENCY MEDICINE

## 2019-10-04 PROCEDURE — 74177 CT ABD & PELVIS W/CONTRAST: CPT

## 2019-10-04 PROCEDURE — 99218 PR INITIAL OBSERVATION CARE/DAY 30 MINUTES: CPT | Performed by: SURGERY

## 2019-10-04 PROCEDURE — 96361 HYDRATE IV INFUSION ADD-ON: CPT

## 2019-10-04 PROCEDURE — 96376 TX/PRO/DX INJ SAME DRUG ADON: CPT

## 2019-10-04 PROCEDURE — 96374 THER/PROPH/DIAG INJ IV PUSH: CPT

## 2019-10-04 PROCEDURE — 85025 COMPLETE CBC W/AUTO DIFF WBC: CPT | Performed by: EMERGENCY MEDICINE

## 2019-10-04 PROCEDURE — 73130 X-RAY EXAM OF HAND: CPT

## 2019-10-04 PROCEDURE — 96375 TX/PRO/DX INJ NEW DRUG ADDON: CPT

## 2019-10-04 PROCEDURE — 73030 X-RAY EXAM OF SHOULDER: CPT

## 2019-10-04 PROCEDURE — 71260 CT THORAX DX C+: CPT

## 2019-10-04 PROCEDURE — 99285 EMERGENCY DEPT VISIT HI MDM: CPT

## 2019-10-04 PROCEDURE — 90471 IMMUNIZATION ADMIN: CPT

## 2019-10-04 PROCEDURE — 90715 TDAP VACCINE 7 YRS/> IM: CPT | Performed by: EMERGENCY MEDICINE

## 2019-10-04 PROCEDURE — 85610 PROTHROMBIN TIME: CPT | Performed by: EMERGENCY MEDICINE

## 2019-10-04 PROCEDURE — 80053 COMPREHEN METABOLIC PANEL: CPT | Performed by: EMERGENCY MEDICINE

## 2019-10-04 PROCEDURE — 36415 COLL VENOUS BLD VENIPUNCTURE: CPT | Performed by: EMERGENCY MEDICINE

## 2019-10-04 PROCEDURE — 83735 ASSAY OF MAGNESIUM: CPT | Performed by: EMERGENCY MEDICINE

## 2019-10-04 PROCEDURE — 99284 EMERGENCY DEPT VISIT MOD MDM: CPT

## 2019-10-04 PROCEDURE — 93005 ELECTROCARDIOGRAM TRACING: CPT

## 2019-10-04 RX ORDER — LIDOCAINE 50 MG/G
1 PATCH TOPICAL DAILY
Status: DISCONTINUED | OUTPATIENT
Start: 2019-10-04 | End: 2019-10-05 | Stop reason: HOSPADM

## 2019-10-04 RX ORDER — METHOCARBAMOL 750 MG/1
750 TABLET, FILM COATED ORAL EVERY 6 HOURS SCHEDULED
Status: DISCONTINUED | OUTPATIENT
Start: 2019-10-05 | End: 2019-10-05 | Stop reason: HOSPADM

## 2019-10-04 RX ORDER — HYDROMORPHONE HCL/PF 1 MG/ML
0.5 SYRINGE (ML) INJECTION
Status: DISCONTINUED | OUTPATIENT
Start: 2019-10-04 | End: 2019-10-05 | Stop reason: HOSPADM

## 2019-10-04 RX ORDER — HYDROMORPHONE HCL/PF 1 MG/ML
1 SYRINGE (ML) INJECTION ONCE
Status: COMPLETED | OUTPATIENT
Start: 2019-10-04 | End: 2019-10-04

## 2019-10-04 RX ORDER — ONDANSETRON 2 MG/ML
4 INJECTION INTRAMUSCULAR; INTRAVENOUS EVERY 6 HOURS PRN
Status: DISCONTINUED | OUTPATIENT
Start: 2019-10-04 | End: 2019-10-05 | Stop reason: HOSPADM

## 2019-10-04 RX ORDER — OXYCODONE HYDROCHLORIDE 5 MG/1
5 TABLET ORAL EVERY 4 HOURS PRN
Status: DISCONTINUED | OUTPATIENT
Start: 2019-10-04 | End: 2019-10-05 | Stop reason: HOSPADM

## 2019-10-04 RX ORDER — FENTANYL CITRATE 50 UG/ML
100 INJECTION, SOLUTION INTRAMUSCULAR; INTRAVENOUS ONCE
Status: COMPLETED | OUTPATIENT
Start: 2019-10-04 | End: 2019-10-04

## 2019-10-04 RX ORDER — OXYCODONE HYDROCHLORIDE 10 MG/1
10 TABLET ORAL EVERY 4 HOURS PRN
Status: DISCONTINUED | OUTPATIENT
Start: 2019-10-04 | End: 2019-10-05 | Stop reason: HOSPADM

## 2019-10-04 RX ORDER — DOCUSATE SODIUM 100 MG/1
100 CAPSULE, LIQUID FILLED ORAL 2 TIMES DAILY
Status: DISCONTINUED | OUTPATIENT
Start: 2019-10-05 | End: 2019-10-05 | Stop reason: HOSPADM

## 2019-10-04 RX ORDER — SENNOSIDES 8.6 MG
2 TABLET ORAL DAILY
Status: DISCONTINUED | OUTPATIENT
Start: 2019-10-05 | End: 2019-10-05 | Stop reason: HOSPADM

## 2019-10-04 RX ORDER — ACETAMINOPHEN 325 MG/1
975 TABLET ORAL EVERY 6 HOURS SCHEDULED
Status: DISCONTINUED | OUTPATIENT
Start: 2019-10-05 | End: 2019-10-05 | Stop reason: HOSPADM

## 2019-10-04 RX ADMIN — HYDROMORPHONE HYDROCHLORIDE 1 MG: 1 INJECTION, SOLUTION INTRAMUSCULAR; INTRAVENOUS; SUBCUTANEOUS at 19:22

## 2019-10-04 RX ADMIN — FENTANYL CITRATE 100 MCG: 50 INJECTION INTRAMUSCULAR; INTRAVENOUS at 18:19

## 2019-10-04 RX ADMIN — SODIUM CHLORIDE 1000 ML: 0.9 INJECTION, SOLUTION INTRAVENOUS at 18:27

## 2019-10-04 RX ADMIN — OXYCODONE HYDROCHLORIDE 10 MG: 10 TABLET ORAL at 22:47

## 2019-10-04 RX ADMIN — HYDROMORPHONE HYDROCHLORIDE 1 MG: 1 INJECTION, SOLUTION INTRAMUSCULAR; INTRAVENOUS; SUBCUTANEOUS at 19:52

## 2019-10-04 RX ADMIN — IOHEXOL 100 ML: 350 INJECTION, SOLUTION INTRAVENOUS at 18:51

## 2019-10-04 RX ADMIN — HYDROMORPHONE HYDROCHLORIDE 1 MG: 1 INJECTION, SOLUTION INTRAMUSCULAR; INTRAVENOUS; SUBCUTANEOUS at 21:06

## 2019-10-04 RX ADMIN — TETANUS TOXOID, REDUCED DIPHTHERIA TOXOID AND ACELLULAR PERTUSSIS VACCINE, ADSORBED 0.5 ML: 5; 2.5; 8; 8; 2.5 SUSPENSION INTRAMUSCULAR at 19:22

## 2019-10-04 NOTE — EMTALA/ACUTE CARE TRANSFER
[address] [address]  Dept: [phone]      EMTALA TRANSFER CONSENT    NAME Kit Kanner                                         1965                              MRN [id number]    I have been informed of my rights regarding examination, treatment, and transfer   by Dr Sarah Myers DO    Benefits: Specialized equipment and/or services available at the receiving facility (Include comment)________________________    Risks: Potential for delay in receiving treatment, Potential deterioration of medical condition, Loss of IV, Increased discomfort during transfer, Possible worsening of condition or death during transfer      Consent for Transfer:  I acknowledge that my medical condition has been evaluated and explained to me by the emergency department physician or other qualified medical person and/or my attending physician, who has recommended that I be transferred to the service of  Accepting Physician: Dr Alessio Loza at [address] Name, [address] : [facility] trauma service  The above potential benefits of such transfer, the potential risks associated with such transfer, and the probable risks of not being transferred have been explained to me, and I fully understand them  The doctor has explained that, in my case, the benefits of transfer outweigh the risks  I agree to be transferred  I authorize the performance of emergency medical procedures and treatments upon me in both transit and upon arrival at the receiving facility  Additionally, I authorize the release of any and all medical records to the receiving facility and request they be transported with me, if possible  I understand that the safest mode of transportation during a medical emergency is an ambulance and that the Hospital advocates the use of this mode of transport   Risks of traveling to the receiving facility by car, including absence of medical control, life sustaining equipment, such as oxygen, and medical personnel has been explained to me and I fully understand them  (COMFORT CORRECT BOX BELOW)  [  ]  I consent to the stated transfer and to be transported by ambulance/helicopter  [  ]  I consent to the stated transfer, but refuse transportation by ambulance and accept full responsibility for my transportation by car  I understand the risks of non-ambulance transfers and I exonerate the Hospital and its staff from any deterioration in my condition that results from this refusal     X___________________________________________    DATE  10/04/19  TIME________  Signature of patient or legally responsible individual signing on patient behalf           RELATIONSHIP TO PATIENT_________________________          Provider Certification    NAME Kit Kanner                                         1965                              MRN [id number]    A medical screening exam was performed on the above named patient  Based on the examination:    Condition Necessitating Transfer The primary encounter diagnosis was Accidental fall, initial encounter  Diagnoses of Multiple rib fractures and Intractable pain were also pertinent to this visit      Patient Condition: The patient has been stabilized such that within reasonable medical probability, no material deterioration of the patient condition or the condition of the unborn child(ramiro) is likely to result from the transfer    Reason for Transfer: Level of Care needed not available at this facility    Transfer Requirements: [address] trauma service   · Space available and qualified personnel available for treatment as acknowledged by    · Agreed to accept transfer and to provide appropriate medical treatment as acknowledged by       Dr Alessio Loza  · Appropriate medical records of the examination and treatment of the patient are provided at the time of transfer   STAFF INITIAL WHEN COMPLETED _______  · Transfer will be performed by qualified personnel from    and appropriate transfer equipment as required, including the use of necessary and appropriate life support measures  Provider Certification: I have examined the patient and explained the following risks and benefits of being transferred/refusing transfer to the patient/family:  General risk, such as traffic hazards, adverse weather conditions, rough terrain or turbulence, possible failure of equipment (including vehicle or aircraft), or consequences of actions of persons outside the control of the transport personnel, Unanticipated needs of medical equipment and personnel during transport, Risk of worsening condition, The possibility of a transport vehicle being unavailable      Based on these reasonable risks and benefits to the patient and/or the unborn child(ramiro), and based upon the information available at the time of the patients examination, I certify that the medical benefits reasonably to be expected from the provision of appropriate medical treatments at another medical facility outweigh the increasing risks, if any, to the individuals medical condition, and in the case of labor to the unborn child, from effecting the transfer      X____________________________________________ DATE 10/04/19        TIME_______      ORIGINAL - SEND TO MEDICAL RECORDS   COPY - SEND WITH PATIENT DURING TRANSFER

## 2019-10-04 NOTE — ED NOTES
Pt was at CT after r/c'd report  Introduced myself to family and provided update on plan of care  Will assess pain when pt returns       Dave Aguillon RN  10/04/19 6785

## 2019-10-05 ENCOUNTER — TELEPHONE (OUTPATIENT)
Dept: OTHER | Facility: OTHER | Age: 54
End: 2019-10-05

## 2019-10-05 ENCOUNTER — APPOINTMENT (OUTPATIENT)
Dept: RADIOLOGY | Facility: HOSPITAL | Age: 54
End: 2019-10-05
Payer: COMMERCIAL

## 2019-10-05 VITALS
HEART RATE: 95 BPM | RESPIRATION RATE: 16 BRPM | SYSTOLIC BLOOD PRESSURE: 116 MMHG | WEIGHT: 235 LBS | DIASTOLIC BLOOD PRESSURE: 71 MMHG | HEIGHT: 67 IN | TEMPERATURE: 97.9 F | OXYGEN SATURATION: 95 % | BODY MASS INDEX: 36.88 KG/M2
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
## 2019-10-05 LAB
ANION GAP SERPL CALCULATED.3IONS-SCNC: 7 MMOL/L (ref 4–13)
ATRIAL RATE: 267 BPM
BUN SERPL-MCNC: 8 MG/DL (ref 5–25)
CALCIUM SERPL-MCNC: 9 MG/DL (ref 8.3–10.1)
CHLORIDE SERPL-SCNC: 106 MMOL/L (ref 100–108)
CO2 SERPL-SCNC: 26 MMOL/L (ref 21–32)
CREAT SERPL-MCNC: 1.1 MG/DL (ref 0.6–1.3)
ERYTHROCYTE [DISTWIDTH] IN BLOOD BY AUTOMATED COUNT: 13.1 % (ref 11.6–15.1)
GFR SERPL CREATININE-BSD FRML MDRD: 75 ML/MIN/1.73SQ M
GLUCOSE SERPL-MCNC: 113 MG/DL (ref 65–140)
HCT VFR BLD AUTO: 41.6 % (ref 36.5–49.3)
HGB BLD-MCNC: 14.1 G/DL (ref 12–17)
MCH RBC QN AUTO: 31.1 PG (ref 26.8–34.3)
MCHC RBC AUTO-ENTMCNC: 33.9 G/DL (ref 31.4–37.4)
MCV RBC AUTO: 92 FL (ref 82–98)
PLATELET # BLD AUTO: 205 THOUSANDS/UL (ref 149–390)
PLATELET # BLD AUTO: 224 THOUSANDS/UL (ref 149–390)
PMV BLD AUTO: 9.6 FL (ref 8.9–12.7)
PMV BLD AUTO: 9.8 FL (ref 8.9–12.7)
POTASSIUM SERPL-SCNC: 3.7 MMOL/L (ref 3.5–5.3)
QRS AXIS: 15 DEGREES
QRSD INTERVAL: 98 MS
QT INTERVAL: 436 MS
QTC INTERVAL: 490 MS
RBC # BLD AUTO: 4.54 MILLION/UL (ref 3.88–5.62)
SODIUM SERPL-SCNC: 139 MMOL/L (ref 136–145)
T WAVE AXIS: 6 DEGREES
VENTRICULAR RATE: 76 BPM
WBC # BLD AUTO: 4.87 THOUSAND/UL (ref 4.31–10.16)

## 2019-10-05 PROCEDURE — G8988 SELF CARE GOAL STATUS: HCPCS

## 2019-10-05 PROCEDURE — 85027 COMPLETE CBC AUTOMATED: CPT | Performed by: PHYSICIAN ASSISTANT

## 2019-10-05 PROCEDURE — 94760 N-INVAS EAR/PLS OXIMETRY 1: CPT

## 2019-10-05 PROCEDURE — G8980 MOBILITY D/C STATUS: HCPCS

## 2019-10-05 PROCEDURE — G8979 MOBILITY GOAL STATUS: HCPCS

## 2019-10-05 PROCEDURE — 93010 ELECTROCARDIOGRAM REPORT: CPT | Performed by: INTERNAL MEDICINE

## 2019-10-05 PROCEDURE — 80048 BASIC METABOLIC PNL TOTAL CA: CPT | Performed by: PHYSICIAN ASSISTANT

## 2019-10-05 PROCEDURE — NC001 PR NO CHARGE: Performed by: PHYSICIAN ASSISTANT

## 2019-10-05 PROCEDURE — G8989 SELF CARE D/C STATUS: HCPCS

## 2019-10-05 PROCEDURE — G8978 MOBILITY CURRENT STATUS: HCPCS

## 2019-10-05 PROCEDURE — G8987 SELF CARE CURRENT STATUS: HCPCS

## 2019-10-05 PROCEDURE — 85049 AUTOMATED PLATELET COUNT: CPT | Performed by: SURGERY

## 2019-10-05 PROCEDURE — 99217 PR OBSERVATION CARE DISCHARGE MANAGEMENT: CPT | Performed by: PHYSICIAN ASSISTANT

## 2019-10-05 PROCEDURE — 71046 X-RAY EXAM CHEST 2 VIEWS: CPT

## 2019-10-05 PROCEDURE — 97166 OT EVAL MOD COMPLEX 45 MIN: CPT

## 2019-10-05 PROCEDURE — 97162 PT EVAL MOD COMPLEX 30 MIN: CPT

## 2019-10-05 RX ORDER — LIDOCAINE 50 MG/G
1 PATCH TOPICAL DAILY
Status: DISCONTINUED | OUTPATIENT
Start: 2019-10-05 | End: 2019-10-05 | Stop reason: HOSPADM

## 2019-10-05 RX ORDER — PANTOPRAZOLE SODIUM 40 MG/1
40 TABLET, DELAYED RELEASE ORAL
Status: DISCONTINUED | OUTPATIENT
Start: 2019-10-05 | End: 2019-10-05 | Stop reason: HOSPADM

## 2019-10-05 RX ORDER — GABAPENTIN 100 MG/1
100 CAPSULE ORAL 3 TIMES DAILY
Qty: 42 CAPSULE | Refills: 0 | Status: SHIPPED | OUTPATIENT
Start: 2019-10-05 | End: 2019-10-19

## 2019-10-05 RX ORDER — DILTIAZEM HYDROCHLORIDE 180 MG/1
360 CAPSULE, COATED, EXTENDED RELEASE ORAL DAILY
Status: DISCONTINUED | OUTPATIENT
Start: 2019-10-05 | End: 2019-10-05 | Stop reason: HOSPADM

## 2019-10-05 RX ORDER — METHOCARBAMOL 750 MG/1
750 TABLET, FILM COATED ORAL EVERY 6 HOURS SCHEDULED
Qty: 40 TABLET | Refills: 1 | Status: SHIPPED | OUTPATIENT
Start: 2019-10-05 | End: 2022-01-13 | Stop reason: ALTCHOICE

## 2019-10-05 RX ORDER — ACETAMINOPHEN 325 MG/1
975 TABLET ORAL EVERY 8 HOURS SCHEDULED
Qty: 30 TABLET | Refills: 0
Start: 2019-10-05 | End: 2022-01-13 | Stop reason: ALTCHOICE

## 2019-10-05 RX ORDER — METOPROLOL SUCCINATE 50 MG/1
50 TABLET, EXTENDED RELEASE ORAL DAILY
Status: DISCONTINUED | OUTPATIENT
Start: 2019-10-05 | End: 2019-10-05 | Stop reason: HOSPADM

## 2019-10-05 RX ORDER — OXYCODONE HYDROCHLORIDE 5 MG/1
5-10 TABLET ORAL EVERY 4 HOURS PRN
Qty: 45 TABLET | Refills: 0 | Status: SHIPPED | OUTPATIENT
Start: 2019-10-05 | End: 2022-01-13 | Stop reason: ALTCHOICE

## 2019-10-05 RX ADMIN — SENNOSIDES 17.2 MG: 8.6 TABLET, FILM COATED ORAL at 09:08

## 2019-10-05 RX ADMIN — LIDOCAINE 1 PATCH: 50 PATCH CUTANEOUS at 00:44

## 2019-10-05 RX ADMIN — ACETAMINOPHEN 975 MG: 325 TABLET ORAL at 06:15

## 2019-10-05 RX ADMIN — ENOXAPARIN SODIUM 30 MG: 30 INJECTION SUBCUTANEOUS at 10:17

## 2019-10-05 RX ADMIN — DOCUSATE SODIUM 100 MG: 100 CAPSULE, LIQUID FILLED ORAL at 09:09

## 2019-10-05 RX ADMIN — OXYCODONE HYDROCHLORIDE 10 MG: 10 TABLET ORAL at 09:14

## 2019-10-05 RX ADMIN — PANTOPRAZOLE SODIUM 40 MG: 40 TABLET, DELAYED RELEASE ORAL at 06:15

## 2019-10-05 RX ADMIN — HYDROMORPHONE HYDROCHLORIDE 0.5 MG: 1 INJECTION, SOLUTION INTRAMUSCULAR; INTRAVENOUS; SUBCUTANEOUS at 00:45

## 2019-10-05 RX ADMIN — DILTIAZEM HYDROCHLORIDE 360 MG: 180 CAPSULE, COATED, EXTENDED RELEASE ORAL at 09:09

## 2019-10-05 RX ADMIN — METOPROLOL SUCCINATE 50 MG: 50 TABLET, EXTENDED RELEASE ORAL at 09:09

## 2019-10-05 RX ADMIN — METHOCARBAMOL 750 MG: 750 TABLET, FILM COATED ORAL at 00:45

## 2019-10-05 RX ADMIN — OXYCODONE HYDROCHLORIDE 10 MG: 10 TABLET ORAL at 04:24

## 2019-10-05 RX ADMIN — METHOCARBAMOL 750 MG: 750 TABLET, FILM COATED ORAL at 06:15

## 2019-10-05 RX ADMIN — ACETAMINOPHEN 975 MG: 325 TABLET ORAL at 12:20

## 2019-10-05 RX ADMIN — LIDOCAINE 1 PATCH: 50 PATCH CUTANEOUS at 10:17

## 2019-10-05 RX ADMIN — METHOCARBAMOL 750 MG: 750 TABLET, FILM COATED ORAL at 12:20

## 2019-10-05 RX ADMIN — OXYCODONE HYDROCHLORIDE 5 MG: 5 TABLET ORAL at 14:08

## 2019-10-05 RX ADMIN — ACETAMINOPHEN 975 MG: 325 TABLET ORAL at 00:45

## 2019-10-05 NOTE — ED NOTES
Pt unhappy with current pain medication orders feels that he will need fentanyl and dilaudid and thinks he could manage his pain better at home  Pt is asking to speak to Doctor about other medication v leaving  Spoke to trauma who will come to discuss with pt         Robinson Cohen RN  10/04/19 1558

## 2019-10-05 NOTE — PLAN OF CARE
Problem: Potential for Falls  Goal: Patient will remain free of falls  Description  INTERVENTIONS:  - Assess patient frequently for physical needs  -  Identify cognitive and physical deficits and behaviors that affect risk of falls    -  [place] fall precautions as indicated by assessment   - Educate patient/family on patient safety including physical limitations  - Instruct patient to call for assistance with activity based on assessment  - Modify environment to reduce risk of injury  - Consider OT/PT consult to assist with strengthening/mobility  Outcome: Progressing     Problem: PAIN - ADULT  Goal: Verbalizes/displays adequate comfort level or baseline comfort level  Description  Interventions:  - Encourage patient to monitor pain and request assistance  - Assess pain using appropriate pain scale  - Administer analgesics based on type and severity of pain and evaluate response  - Implement non-pharmacological measures as appropriate and evaluate response  - Consider cultural and social influences on pain and pain management  - Notify physician/advanced practitioner if interventions unsuccessful or patient reports new pain  Outcome: Progressing     Problem: INFECTION - ADULT  Goal: Absence or prevention of progression during hospitalization  Description  INTERVENTIONS:  - Assess and monitor for signs and symptoms of infection  - Monitor lab/diagnostic results  - Monitor all insertion sites, i e  indwelling lines, tubes, and drains  - Monitor endotracheal if appropriate and nasal secretions for changes in amount and color  - [place] appropriate cooling/warming therapies per order  - Administer medications as ordered  - Instruct and encourage patient and family to use good hand hygiene technique  - Identify and instruct in appropriate isolation precautions for identified infection/condition  Outcome: Progressing     Problem: SAFETY ADULT  Goal: Patient will remain free of falls  Description  INTERVENTIONS:  - Assess patient frequently for physical needs  -  Identify cognitive and physical deficits and behaviors that affect risk of falls    -  [place] fall precautions as indicated by assessment   - Educate patient/family on patient safety including physical limitations  - Instruct patient to call for assistance with activity based on assessment  - Modify environment to reduce risk of injury  - Consider OT/PT consult to assist with strengthening/mobility  Outcome: Progressing  Goal: Maintain or return to baseline ADL function  Description  INTERVENTIONS:  -  Assess patient's ability to carry out ADLs; assess patient's baseline for ADL function and identify physical deficits which impact ability to perform ADLs (bathing, care of mouth/teeth, toileting, grooming, dressing, etc )  - Assess/evaluate cause of self-care deficits   - Assess range of motion  - Assess patient's mobility; develop plan if impaired  - Assess patient's need for assistive devices and provide as appropriate  - Encourage maximum independence but intervene and supervise when necessary  - Involve family in performance of ADLs  - Assess for home care needs following discharge   - Consider OT consult to assist with ADL evaluation and planning for discharge  - Provide patient education as appropriate  Outcome: Progressing  Goal: Maintain or return mobility status to optimal level  Description  INTERVENTIONS:  - Assess patient's baseline mobility status (ambulation, transfers, stairs, etc )    - Identify cognitive and physical deficits and behaviors that affect mobility  - Identify mobility aids required to assist with transfers and/or ambulation (gait belt, sit-to-stand, lift, walker, cane, etc )  - [place] fall precautions as indicated by assessment  - Record patient progress and toleration of activity level on Mobility SBAR; progress patient to next Phase/Stage  - Instruct patient to call for assistance with activity based on assessment  - Consider rehabilitation consult to assist with strengthening/weightbearing, etc   Outcome: Progressing     Problem: DISCHARGE PLANNING  Goal: Discharge to home or other facility with appropriate resources  Description  INTERVENTIONS:  - Identify barriers to discharge w/patient and caregiver  - Arrange for needed discharge resources and transportation as appropriate  - Identify discharge learning needs (meds, wound care, etc )  - Arrange for interpretive services to assist at discharge as needed  - Refer to Case Management Department for coordinating discharge planning if the patient needs post-hospital services based on physician/advanced practitioner order or complex needs related to functional status, cognitive ability, or social support system  Outcome: Progressing     Problem: Knowledge Deficit  Goal: Patient/family/caregiver demonstrates understanding of disease process, treatment plan, medications, and discharge instructions  Description  Complete learning assessment and assess knowledge base    Interventions:  - Provide teaching at level of understanding  - Provide teaching via preferred learning methods  Outcome: Progressing

## 2019-10-05 NOTE — DISCHARGE INSTRUCTIONS
Traumatic Rib Fracture Discharge Instructions: Activity:  - Walking and normal light activities are encouraged  Normal daily activities including climbing steps are okay  - Avoid lifting greater than 10 pounds, any strenuous activities and/or exercise, and contact sports until cleared by the trauma service  - Continue using the incentive spirometer at least 10 times every hour while awake  Return to work:    - You may return to work once you are cleared by the trauma service  Medications:    - You may resume all of your regular medications, including blood thinners and aspirin, after going home unless otherwise instructed  Please refer to your discharge medication list for further details  - Please take the pain medications as directed  - You are encouraged to use non-narcotic pain medications first and whenever possible  Reserve the use of narcotic pain medication for moderate to severe pain not controlled by non-narcotic medications   - No driving while taking narcotic pain medications  - You may become constipated, especially if taking pain medications  You may take any over the counter stool softeners or laxatives as needed  Examples: Milk of Magnesia, Colace, Senna  Additional Instructions:  - If you have any questions or concerns after discharge please call the office   - Call office or return to ER if fever greater than 101, chills, worsening/uncontrollable pain, develop productive cough, increasing shortness of breath, and/or difficulty breathing  Opioid Analgesia Discharge Instructions: You were prescribed a multi-modal pain regimen this admission for treatment of your pain  It is important to use multiple agents to control your pain, not just a narcotic medication  You should wean down the narcotic medication first (oxycodone)   It is not expected that you will require a narcotic medication for longer than a few days but you may remain on other pain medications for a longer period of time in order to control your pain  While you are taking narcotics (i e  Oxycodone), you should monitor for constipation (no bowel movement in 48 hours)  It is recommended you start taking over the counter treatments if needed to avoid complications from constipation  You may have been prescribed a stool softner such as colace or senna to take while you are taking a narcotic medication to help prevent constipation  Examples of over the counter medications for constipation are Milk of Magnesia, Colace, Senna  You will be re-evaluated in a few weeks following discharge from the hospital  If you need any refills on your medications, or if you are not able to wean down your narcotics, please call the office

## 2019-10-05 NOTE — PLAN OF CARE
Problem: Potential for Falls  Goal: Patient will remain free of falls  Description  INTERVENTIONS:  - Assess patient frequently for physical needs  -  Identify cognitive and physical deficits and behaviors that affect risk of falls    -  [place] fall precautions as indicated by assessment   - Educate patient/family on patient safety including physical limitations  - Instruct patient to call for assistance with activity based on assessment  - Modify environment to reduce risk of injury  - Consider OT/PT consult to assist with strengthening/mobility  Outcome: Adequate for Discharge     Problem: PAIN - ADULT  Goal: Verbalizes/displays adequate comfort level or baseline comfort level  Description  Interventions:  - Encourage patient to monitor pain and request assistance  - Assess pain using appropriate pain scale  - Administer analgesics based on type and severity of pain and evaluate response  - Implement non-pharmacological measures as appropriate and evaluate response  - Consider cultural and social influences on pain and pain management  - Notify physician/advanced practitioner if interventions unsuccessful or patient reports new pain  Outcome: Adequate for Discharge     Problem: INFECTION - ADULT  Goal: Absence or prevention of progression during hospitalization  Description  INTERVENTIONS:  - Assess and monitor for signs and symptoms of infection  - Monitor lab/diagnostic results  - Monitor all insertion sites, i e  indwelling lines, tubes, and drains  - Monitor endotracheal if appropriate and nasal secretions for changes in amount and color  - [place] appropriate cooling/warming therapies per order  - Administer medications as ordered  - Instruct and encourage patient and family to use good hand hygiene technique  - Identify and instruct in appropriate isolation precautions for identified infection/condition  Outcome: Adequate for Discharge     Problem: SAFETY ADULT  Goal: Patient will remain free of falls  Description  INTERVENTIONS:  - Assess patient frequently for physical needs  -  Identify cognitive and physical deficits and behaviors that affect risk of falls    -  [place] fall precautions as indicated by assessment   - Educate patient/family on patient safety including physical limitations  - Instruct patient to call for assistance with activity based on assessment  - Modify environment to reduce risk of injury  - Consider OT/PT consult to assist with strengthening/mobility  Outcome: Adequate for Discharge  Goal: Maintain or return to baseline ADL function  Description  INTERVENTIONS:  -  Assess patient's ability to carry out ADLs; assess patient's baseline for ADL function and identify physical deficits which impact ability to perform ADLs (bathing, care of mouth/teeth, toileting, grooming, dressing, etc )  - Assess/evaluate cause of self-care deficits   - Assess range of motion  - Assess patient's mobility; develop plan if impaired  - Assess patient's need for assistive devices and provide as appropriate  - Encourage maximum independence but intervene and supervise when necessary  - Involve family in performance of ADLs  - Assess for home care needs following discharge   - Consider OT consult to assist with ADL evaluation and planning for discharge  - Provide patient education as appropriate  Outcome: Adequate for Discharge  Goal: Maintain or return mobility status to optimal level  Description  INTERVENTIONS:  - Assess patient's baseline mobility status (ambulation, transfers, stairs, etc )    - Identify cognitive and physical deficits and behaviors that affect mobility  - Identify mobility aids required to assist with transfers and/or ambulation (gait belt, sit-to-stand, lift, walker, cane, etc )  - [place] fall precautions as indicated by assessment  - Record patient progress and toleration of activity level on Mobility SBAR; progress patient to next Phase/Stage  - Instruct patient to call for assistance with activity based on assessment  - Consider rehabilitation consult to assist with strengthening/weightbearing, etc   Outcome: Adequate for Discharge     Problem: DISCHARGE PLANNING  Goal: Discharge to home or other facility with appropriate resources  Description  INTERVENTIONS:  - Identify barriers to discharge w/patient and caregiver  - Arrange for needed discharge resources and transportation as appropriate  - Identify discharge learning needs (meds, wound care, etc )  - Arrange for interpretive services to assist at discharge as needed  - Refer to Case Management Department for coordinating discharge planning if the patient needs post-hospital services based on physician/advanced practitioner order or complex needs related to functional status, cognitive ability, or social support system  Outcome: Adequate for Discharge     Problem: Knowledge Deficit  Goal: Patient/family/caregiver demonstrates understanding of disease process, treatment plan, medications, and discharge instructions  Description  Complete learning assessment and assess knowledge base    Interventions:  - Provide teaching at level of understanding  - Provide teaching via preferred learning methods  Outcome: Adequate for Discharge

## 2019-10-05 NOTE — ASSESSMENT & PLAN NOTE
- Status post fall with the below noted injuries  - Fall precautions     - PT and OT evaluation and treatment as indicated

## 2019-10-05 NOTE — ED PROVIDER NOTES
History  Chief Complaint   Patient presents with    Fall     fell off deck and landed on back on concrete, c/o pain in back and right rib area     66-year-old male with past medical history of atrial fibrillation that is rate controlled, GERD, presents to the ED for evaluation right sided chest pain after a fall  Patient states that about an hour prior to arrival he tripped over an umbrella on his deck and flew forward and fell down about 4 ft off his deck and landed on concrete floor on his right side  Patient denies hitting head or neck  Patient did not lose consciousness  Since his fall patient has been complaining right-sided chest wall pain and having difficulty taking deep breath as a result  Patient also complains of mild right shoulder pain as well as pain to the lateral aspect his right 5th digit  Patient is right-hand dominant  History provided by:  Patient      Prior to Admission Medications   Prescriptions Last Dose Informant Patient Reported? Taking? ALPRAZolam (XANAX) 0 25 mg tablet Not Taking at Unknown time  Yes No   Sig: Take 1 tablet by mouth   aspirin (ECOTRIN LOW STRENGTH) 81 mg EC tablet Not Taking at Unknown time  Yes No   Sig: Take by mouth   clotrimazole-betamethasone (LOTRISONE) 1-0 05 % cream Unknown at Unknown time  No No   Sig: APPLY SPARINGLY TO AFFECTED AREA(S) THREE TIMES A DAY   diltiazem (CARDIZEM CD) 360 MG 24 hr capsule 10/4/2019 at 0800  Yes Yes   Sig: Take 360 mg by mouth daily     dronedarone (MULTAQ) 400 mg tablet Not Taking at Unknown time  Yes No   Sig: Take by mouth   meclizine (ANTIVERT) 25 mg tablet Not Taking at Unknown time  No No   Sig: Take 1 tablet (25 mg total) by mouth 3 (three) times a day as needed for dizziness for up to 15 doses   Patient not taking: Reported on 10/4/2019   metoprolol succinate (TOPROL-XL) 50 mg 24 hr tablet 10/4/2019 at 0800  Yes Yes   oxyCODONE-acetaminophen (PERCOCET) 5-325 mg per tablet 10/3/2019 at 2200 Self Yes Yes   Sig: Take 1 tablet by mouth every 8 (eight) hours as needed for moderate pain   pantoprazole (PROTONIX) 40 mg tablet 10/4/2019 at 0800  No Yes   Sig: Take 1 tablet (40 mg total) by mouth daily   tadalafil (CIALIS) 5 MG tablet Unknown at Unknown time  No No   Sig: Take 1 tablet (5 mg total) by mouth daily as needed for erectile dysfunction      Facility-Administered Medications: None       Past Medical History:   Diagnosis Date    Atrial fibrillation (HCC)     Cervicalgia     ONSET [id number]    Graham Whyte infection     GERD (gastroesophageal reflux disease)     Hypersomnia with sleep apnea     RESOLVED [id number]       Past Surgical History:   Procedure Laterality Date    APPENDECTOMY      CERVICAL FUSION      CHOLECYSTECTOMY      COLONOSCOPY  2009    KNEE SURGERY      LAPAROSCOPIC GASTRIC BANDING      gastric sleeve    SHOULDER SURGERY         Family History   Problem Relation Age of Onset    Hyperlipidemia Mother     Hypertension Mother     Coronary artery disease Father     Hypertension Father     Stroke Father      I have reviewed and agree with the history as documented  Social History     Tobacco Use    Smoking status: Current Every Day Smoker     Packs/day: 1 00     Years: 20 00     Pack years: 20 00     Types: E-Cigarettes    Smokeless tobacco: Never Used   Substance Use Topics    Alcohol use: No    Drug use: No        Review of Systems   Constitutional: Negative for activity change, fatigue and fever  HENT: Negative for congestion, ear discharge and sore throat  Eyes: Negative for pain and redness  Respiratory: Negative for cough, chest tightness, shortness of breath and wheezing  Cardiovascular: Positive for chest pain  Gastrointestinal: Negative for abdominal pain, diarrhea, nausea and vomiting  Endocrine: Negative for cold intolerance  Genitourinary: Negative for dysuria and urgency  Musculoskeletal: Positive for arthralgias  Negative for back pain     Neurological: Negative for dizziness, weakness and headaches  Psychiatric/Behavioral: Negative for agitation and behavioral problems  Physical Exam  Physical Exam   Constitutional: He is oriented to person, place, and time  He appears well-developed and well-nourished  HENT:   Head: Normocephalic and atraumatic  Nose: Nose normal    Mouth/Throat: Oropharynx is clear and moist    Eyes: Conjunctivae and EOM are normal    Neck: Normal range of motion  Neck supple  Cardiovascular: Normal rate, regular rhythm and normal heart sounds  Pulmonary/Chest: Effort normal and breath sounds normal  He exhibits tenderness  Moderate anterior and posterior chest wall tenderness to palpation noted on the right side without any ecchymosis, bony deformities, contusions, or any other skin changes  Abdominal: Soft  Bowel sounds are normal  He exhibits no distension  There is no tenderness  Musculoskeletal: Normal range of motion  Pulses intact bilateral upper extremities  Abrasions noted to the 5th MCP joint of the dorsal aspect of right hand  Mild tenderness to palpation noted to the right shoulder Sissy Tj any bony deformities or skin changes  Neurological: He is alert and oriented to person, place, and time  Skin: Skin is warm  Psychiatric: He has a normal mood and affect  His behavior is normal  Judgment and thought content normal    Nursing note and vitals reviewed        Vital Signs  ED Triage Vitals [10/04/19 1734]   Temperature Pulse Respirations Blood Pressure SpO2   (!) 96 7 °F (35 9 °C) 78 20 126/77 100 %      Temp Source Heart Rate Source Patient Position - Orthostatic VS BP Location FiO2 (%)   Tympanic Monitor Lying Right arm --      Pain Score       Worst Possible Pain           Vitals:    10/04/19 1734 10/04/19 1945 10/04/19 2000 10/04/19 2015   BP: 126/77 140/85 138/68 144/89   Pulse: 78 88 89 91   Patient Position - Orthostatic VS: Lying            Visual Acuity      ED Medications  Medications HYDROmorphone (DILAUDID) injection 1 mg (has no administration in time range)   sodium chloride 0 9 % bolus 1,000 mL (0 mL Intravenous Stopped 10/4/19 2028)   fentanyl citrate (PF) 100 MCG/2ML 100 mcg (100 mcg Intravenous Given 10/4/19 1819)   tetanus-diphtheria-acellular pertussis (BOOSTRIX) IM injection 0 5 mL (0 5 mL Intramuscular Given 10/4/19 1922)   iohexol (OMNIPAQUE) 350 MG/ML injection (MULTI-DOSE) 100 mL (100 mL Intravenous Given 10/4/19 1851)   HYDROmorphone (DILAUDID) injection 1 mg (1 mg Intravenous Given 10/4/19 1922)   HYDROmorphone (DILAUDID) injection 1 mg (1 mg Intravenous Given 10/4/19 1952)       Diagnostic Studies  Results Reviewed     Procedure Component Value Units Date/Time    Comprehensive metabolic panel [[id number]] Collected:  10/04/19 1814    Lab Status:  Final result Specimen:  Blood from Arm, Left Updated:  10/04/19 1835     Sodium 139 mmol/L      Potassium 3 8 mmol/L      Chloride 103 mmol/L      CO2 29 mmol/L      ANION GAP 7 mmol/L      BUN 9 mg/dL      Creatinine 1 20 mg/dL      Glucose 125 mg/dL      Calcium 9 0 mg/dL      AST 20 U/L      ALT 20 U/L      Alkaline Phosphatase 83 U/L      Total Protein 6 8 g/dL      Albumin 3 7 g/dL      Total Bilirubin 0 40 mg/dL      eGFR 67 ml/min/1 73sq m     Narrative:       [place] guidelines for Chronic Kidney Disease (CKD):     Stage 1 with normal or high GFR (GFR > 90 mL/min/1 73 square meters)    Stage 2 Mild CKD (GFR = 60-89 mL/min/1 73 square meters)    Stage 3A Moderate CKD (GFR = 45-59 mL/min/1 73 square meters)    Stage 3B Moderate CKD (GFR = 30-44 mL/min/1 73 square meters)    Stage 4 Severe CKD (GFR = 15-29 mL/min/1 73 square meters)    Stage 5 End Stage CKD (GFR <15 mL/min/1 73 square meters)  Note: GFR calculation is accurate only with a steady state creatinine    Magnesium [[id number]]  (Normal) Collected:  10/04/19 1814    Lab Status:  Final result Specimen:  Blood from Arm, Left Updated:  10/04/19 1835     Magnesium 1 9 mg/dL     Protime-INR [[id number]]  (Normal) Collected:  10/04/19 1814    Lab Status:  Final result Specimen:  Blood from Arm, Left Updated:  10/04/19 1834     Protime 10 9 seconds      INR 1 01    APTT [[id number]]  (Normal) Collected:  10/04/19 1814    Lab Status:  Final result Specimen:  Blood from Arm, Left Updated:  10/04/19 1834     PTT 23 seconds     CBC and differential [[id number]]  (Abnormal) Collected:  10/04/19 1814    Lab Status:  Final result Specimen:  Blood from Arm, Left Updated:  10/04/19 1820     WBC 9 25 Thousand/uL      RBC 4 47 Million/uL      Hemoglobin 13 7 g/dL      Hematocrit 41 4 %      MCV 93 fL      MCH 30 6 pg      MCHC 33 1 g/dL      RDW 12 7 %      MPV 9 2 fL      Platelets 490 Thousands/uL      nRBC 0 /100 WBCs      Neutrophils Relative 79 %      Immat GRANS % 0 %      Lymphocytes Relative 10 %      Monocytes Relative 8 %      Eosinophils Relative 2 %      Basophils Relative 1 %      Neutrophils Absolute 7 39 Thousands/µL      Immature Grans Absolute 0 04 Thousand/uL      Lymphocytes Absolute 0 91 Thousands/µL      Monocytes Absolute 0 69 Thousand/µL      Eosinophils Absolute 0 16 Thousand/µL      Basophils Absolute 0 06 Thousands/µL                  TRAUMA - CT chest abdomen pelvis w contrast   Final Result by Pippa Aponte MD (10/04 1910)      Multiple acute right-sided rib fractures as above  Otherwise no acute traumatic injury to the chest, abdomen, pelvis        Workstation performed: [id number]         XR shoulder 2+ views RIGHT    (Results Pending)   XR hand 3+ views RIGHT    (Results Pending)              Procedures  ECG 12 Lead Documentation Only  Date/Time: 10/4/2019 6:17 PM  Performed by: Sarah Myers DO  Authorized by: Sarah Myers DO     Indications / Diagnosis:  Chest pain  ECG reviewed by me, the ED Provider: yes    Patient location:  ED  Previous ECG:     Previous ECG:  Compared to current    Similarity:  No change    Comparison to cardiac monitor: Yes    Interpretation:     Interpretation: abnormal    Comments:      Irregularly irregular rhythm, rate 76, normal axis, normal intervals, low amplitude noted throughout, atrial fibrillation that is unchanged from previous study  ED Course  ED Course as of Oct 04 2036   Fri Oct 04, 2019   1921 Case discussed with Trauma attending on call, Dr Joe Trujillo, who accepted patient to [facility] for further evaluation and management  EMTALA completed by patient  MDM  Number of Diagnoses or Management Options  Accidental fall, initial encounter: new and requires workup  Intractable pain: new and requires workup  Multiple rib fractures: new and requires workup  Diagnosis management comments: Obtain blood work, EKG, CT chest/abdomen/pelvis       Amount and/or Complexity of Data Reviewed  Clinical lab tests: ordered and reviewed  Tests in the radiology section of CPT®: ordered and reviewed  Tests in the medicine section of CPT®: ordered and reviewed  Review and summarize past medical records: yes  Independent visualization of images, tracings, or specimens: yes    Risk of Complications, Morbidity, and/or Mortality  General comments: Patient's lab work was otherwise unremarkable  CT scan showed multiple rib fractures on the right side  Patient's pain is controlled in the ED with multiple doses of IV narcotics  At this point patient is transferred to [facility] for [facility] management  Patient agrees with admission plans  EMTALA completed by patient  Patient Progress  Patient progress: stable      Disposition  Final diagnoses:   Accidental fall, initial encounter   Multiple rib fractures   Intractable pain     Time reflects when diagnosis was documented in both MDM as applicable and the Disposition within this note     Time User Action Codes Description Comment    10/4/2019  7:23 PM Hue Vitale Add [W19  XXXA] Accidental fall, initial encounter     10/4/2019  7:23 PM Ryan Mhoan Add [S22 49XA] Multiple rib fractures     10/4/2019  7:23 PM Ryan Mohan Add [R52] Intractable pain       ED Disposition     ED Disposition Condition Date/Time Comment    Transfer to Another Facility-In Network  Fri Oct 4, 2019  7:23 PM Hector Araujo should be transferred out to [place] [facility] trauma service under Dr Abram Shanks MD Documentation      Most Recent Value   Patient Condition  The patient has been stabilized such that within reasonable medical probability, no material deterioration of the patient condition or the condition of the unborn child(ramiro) is likely to result from the transfer   Reason for Transfer  Level of Care needed not available at this facility   Benefits of Transfer  Specialized equipment and/or services available at the receiving facility (Include comment)________________________   Risks of Transfer  Potential for delay in receiving treatment, Potential deterioration of medical condition, Loss of IV, Increased discomfort during transfer, Possible worsening of condition or death during transfer   Accepting Physician  Dr ZOLTAN Lao [address] Name, [address] [facility] trauma service   Sending MD Dino William, DO   Provider Certification  General risk, such as traffic hazards, adverse weather conditions, rough terrain or turbulence, possible failure of equipment (including vehicle or aircraft), or consequences of actions of persons outside the control of the transport personnel, Unanticipated needs of medical equipment and personnel during transport, Risk of worsening condition, The possibility of a transport vehicle being unavailable      RN Documentation      Most [facility] Name, [address] [facility] trauma service   Report Given to  [facility] SHAMIR yuan RN      Follow-up Information    None         Patient's Medications Discharge Prescriptions    No medications on file     No discharge procedures on file      ED Provider  Electronically Signed by           Gwen Pablo DO  10/04/19 9763

## 2019-10-05 NOTE — H&P
H&P Exam - Trauma   Radha Taylor 62 y o  male MRN: [id number]  Unit/Bed#: [facility] 606-01 Encounter: [id number]    Assessment/Plan   Trauma Alert: Other transfer   Model of Arrival: Ambulance  Trauma Team: Attending Duane Jimenez, Resident Chelly Cedeño  Consultants: None    Trauma Active Problems: R sided rib fx    Trauma Plan:   Admit  Incentive spirometry   Analgesia  Acute pain consult     Chief Complaint: R-sided chest wall pain    History of Present Illness   HPI:  Radha Taylor is a 62 y o  male with pmhx atrial fibrillation on rate control medications, and chronic pain who presents as trauma transfer from [place] [address] to [facility] with multiple R-sided rib fractures after mechanical fall that occurred at home earlier this evening  Fell down 5 steps, landing on concrete below  Bradley Hubert onto his R side  Non-ambulatory at the scene, required assistance  Felt immediate R sided chest wall pain, worse with deep inspiration  No LOC  No anticoagulation, no antiplatelet medications  Mechanism:Fall    Review of Systems   Respiratory: Negative  Negative for cough and shortness of breath  Cardiovascular: Positive for chest pain  Gastrointestinal: Negative  Negative for abdominal pain, nausea and vomiting  Historical Information   History obtained from the patient and the patient's wife, and per chart review       Past Medical History:   Diagnosis Date    Atrial fibrillation ([address] )     Cervicalgia     ONSET 30OCT2008    Graham Whyte infection     GERD (gastroesophageal reflux disease)     Hypersomnia with sleep apnea     RESOLVED [id number]     Past Surgical History:   Procedure Laterality Date    APPENDECTOMY      CERVICAL FUSION      CHOLECYSTECTOMY      COLONOSCOPY  2009    KNEE SURGERY      LAPAROSCOPIC GASTRIC BANDING      gastric sleeve    SHOULDER SURGERY       Social History   Social History     Substance and Sexual Activity   Alcohol Use No     Social History     Substance and Sexual Activity   Drug Use No     Social History     Tobacco Use   Smoking Status Current Every Day Smoker    Packs/day: 1 00    Years: 20 00    Pack years: 20 00    Types: E-Cigarettes   Smokeless Tobacco Never Used     Immunization History   Administered Date(s) Administered    Tdap 10/04/2019     Last Tetanus: 10/4/19  Family History: Non-contributory      Meds/Allergies   all current active meds have been reviewed    No Known Allergies      PHYSICAL EXAM  Objective   Vitals:   First set: Temperature: 98 7 °F (37 1 °C) (10/04/19 2149)  Pulse: 98 (10/04/19 2149)  Respirations: 20 (10/04/19 2149)  Blood Pressure: 138/71 (10/04/19 2149)    Primary Survey:   (A) Airway: intact   (B) Breathing: bilateral breath sounds  (C) Circulation: Pulses:   normal  (D) Disabliity:  GCS Total:  15  (E) Expose:  Completed    Secondary Survey: (Click on Physical Exam tab above)  Physical Exam   Constitutional: He is oriented to person, place, and time  He appears well-developed and well-nourished  HENT:   Head: Normocephalic and atraumatic  Right Ear: Tympanic membrane and external ear normal  No hemotympanum  Left Ear: Tympanic membrane and external ear normal  No hemotympanum  Nose: Nose normal  No nasal septal hematoma  Mouth/Throat: Uvula is midline and oropharynx is clear and moist    Eyes: Pupils are equal, round, and reactive to light  Conjunctivae, EOM and lids are normal    Pupils 2mm b/l, reactive    Neck: Normal range of motion and full passive range of motion without pain  Neck supple  No spinous process tenderness present  Normal range of motion present  Cardiovascular: Normal pulses  An irregularly irregular rhythm present  No murmur heard  Pulmonary/Chest: Effort normal and breath sounds normal  He exhibits tenderness  He exhibits no crepitus  Tenderness to palpation lateral right chest wall and posterior aspect of upper and lower ribs on right   Abdominal: Soft   Normal appearance and bowel sounds are normal  There is no hepatosplenomegaly  There is no tenderness  Musculoskeletal:        Right shoulder: Normal         Left shoulder: Normal         Right elbow: Normal        Left elbow: Normal         Right wrist: Normal         Left wrist: Normal         Right hip: Normal         Left hip: Normal         Right knee: Normal         Left knee: Normal         Right ankle: Normal         Left ankle: Normal         Cervical back: Normal         Thoracic back: Normal         Lumbar back: Normal    Neurological: He is alert and oriented to person, place, and time  He has normal strength  No cranial nerve deficit or sensory deficit  GCS eye subscore is 4  GCS verbal subscore is 5  GCS motor subscore is 6  Skin: Skin is warm and dry  Abrasion noted  Psychiatric: He has a normal mood and affect  His speech is normal and behavior is normal  Thought content normal    Vitals reviewed  Invasive Devices     Peripheral Intravenous Line            Peripheral IV 10/04/19 Left Antecubital less than 1 day                Lab Results: Results: I have personally reviewed pertinent reports  Imaging/EKG Studies: Results: I have personally reviewed pertinent reports        Code Status: Level 1 - Full Code  Advance Directive and Living Will:      Power of :    POLST:

## 2019-10-05 NOTE — PHYSICAL THERAPY NOTE
Physical Therapy Evaluation      10/05/19 1028   Note Type   Note type Eval only   Pain Assessment   Pain Assessment 0-10   Pain Score Worst Possible Pain   Pain Type Acute pain   Pain Location Rib cage   Pain Orientation Right   Effect of Pain on Daily Activities pt reports increased pain with bed mobility   Patient's Stated Pain Goal No pain   Home Living   Type of [address] Two level;Bed/bath upstairs; Able to live on main level with bedroom/bathroom;Stairs to enter without rails  (3 CLAYTON front door, 2 CLAYTON garage)   Home Equipment   (no DME)   Prior Function   Level of [place] Independent with ADLs and functional mobility   Lives With Spouse   Receives Help From Family   ADL Assistance Independent   IADLs Independent   Falls in the last 6 months 1 to 4   Vocational Full time employment   Comments Pt reoprts wife will be at home with him upon DC   Restrictions/Precautions   Weight Bearing Precautions Per Order No   Other Precautions Pain; Fall Risk;Multiple lines;Telemetry   General   Family/Caregiver Present Yes   Cognition   Overall Cognitive Status WFL   Arousal/Participation Alert   Orientation Level Oriented to person;Oriented to place;Oriented to time;Oriented to situation   Following Commands Follows all commands and directions without difficulty   RLE Assessment   RLE Assessment WFL   LLE Assessment   LLE Assessment WFL   Coordination   Movements are Fluid and Coordinated 0   Coordination and Movement Description slowed movement due to pain   Sensation WFL   Transfers   Sit to Stand 5  Supervision   Additional items Assist x 1; Increased time required;Verbal cues   Stand to Sit 5  Supervision   Additional items Assist x 1; Increased time required;Verbal cues   Ambulation/Elevation   Gait pattern Antalgic; Forward Flexion; Excessively slow   Gait Assistance 5  Supervision   Additional items Assist x 1   Assistive Device Rolling walker   Distance 70 ftx2   Stair Management Assistance 5 Supervision   Additional items Assist x 1;Verbal cues; Increased time required   Stair Management Technique One rail L;Step to pattern   Number of Stairs 3   Balance   Static Sitting Good   Dynamic Sitting Good   Static Standing Fair +   Dynamic Standing Fair   Ambulatory Fair   Endurance Deficit   Endurance Deficit Yes   Endurance Deficit Description significant R trunk pain   Activity Tolerance   Activity Tolerance Patient tolerated treatment well;Patient limited by pain   Medical Staff Made Aware OT   Nurse Made Aware Yes Germain   Assessment   Prognosis Good   Problem List Impaired balance;Decreased mobility; Decreased coordination;Pain   Assessment Pt is a 62year old male initially presenting to [facility] ED on 10/4/19 s/p fall without head trauma or LOC  Pt transferred to B same day due to trauma and resulting multiple R rib fx  Pt has PMH significant for a fib, GERD, IBS, anxiety, and cervical fusion surgery  Pt presents for initial physical therapy evaluation today seated EOB and is agreeable to therapy  Pt transferred sit to stand with supervision  Pt ambulated 70ft x2 with RW and supervision  Pt ascended/descended 3 steps with use of L handrail  Pt remained seated EOB at end of session with all needs within reach and RN Lynne Calderon notified  Pt educated on bed mobility strategies  PT to recommend home with increased family support and use of RW for ambulation during initial recovery   PT to sign of   Barriers to Discharge None   Goals   Patient Goals to go home for birthday party   PT Treatment Day 0   Plan   Treatment/Interventions   (DC PT)   PT Frequency   (DC PT)   Recommendation   Recommendation D/C PT;Home with family support   Equipment Recommended Walker   PT - OK to Discharge Yes   Modified [place] Scale   Modified [place] Scale 2   Barthel Index   Feeding 10   Bathing 0   Grooming Score 5   Dressing Score 5   Bladder Score 10   Bowels Score 10   Toilet Use Score 10   Transfers (Bed/Chair) Score 10   Mobility (Level Surface) Score 10   Stairs Score 5   Barthel Index Score SOLA Blankenship

## 2019-10-05 NOTE — PROGRESS NOTES
Progress Note - Kolton Black 1965, 62 y o  male MRN: [id number]    Unit/Bed#: [facility] 606-01 Encounter: [id number]    Primary Care Provider: Patrizia Calderon MD   Date and time admitted to hospital: 10/4/2019  9:42 PM        Fall  Assessment & Plan  - Status post fall with the below noted injuries  - Fall precautions     - PT and OT evaluation and treatment as indicated  * Multiple closed fractures of ribs of right side  Assessment & Plan  - Multiple acute right-sided rib fractures (3, 4, 5, & 8)  - Continue rib fracture protocol     - Continue multimodal analgesic regimen     - Continue to encourage incentive spirometer use and adequate pulmonary hygiene  - Follow-up repeat chest x-ray today   - PT and OT evaluation and treatment as indicated  - Will need outpatient follow-up in the trauma clinic in 2 weeks  Bedside nurse rounds completed with nurse Cheri Cespedes  Prophylaxis: Sequential compression device (Venodyne)  and Enoxaparin (Lovenox)    Disposition:   Anticipate discharge home attending therapy evaluation  Code status:  Level 1 - Full Code    Consultants:   Acute Pain service  Is the patient 72 years or older?: No    SUBJECTIVE:     Transfer from:  [facility]  Outside Films Received: yes  Tertiary Exam Due on: 10/5/2019    Mechanism of Injury:Fall    Details related to Injury: +LOC:  no    Chief Complaint:  "My right side hurts  "    HPI/Last 24 hour events:   Patient complains of right chest wall pain and right back pain  He notes he has had some difficulty taking deep breaths, but no shortness breath or difficulty breathing at rest   He has pain with movement in his right chest   Aside from the right chest pain, he offers no complaints  The pain medication as prescribed is helping him and he was able to get some rest overnight      Active medications:           Current Facility-Administered Medications:     acetaminophen (TYLENOL) tablet 975 mg, 975 mg, Oral, Q6H [address], 975 mg at 10/05/19 0615    diltiazem (CARDIZEM CD) 24 hr capsule 360 mg, 360 mg, Oral, Daily, 360 mg at 10/05/19 0909    docusate sodium (COLACE) capsule 100 mg, 100 mg, Oral, BID, 100 mg at 10/05/19 0909    enoxaparin (LOVENOX) subcutaneous injection 30 mg, 30 mg, Subcutaneous, Q12H [address], 30 mg at 10/05/19 1017    HYDROmorphone (DILAUDID) injection 0 5 mg, 0 5 mg, Intravenous, Q1H PRN, 0 5 mg at 10/05/19 0045    lidocaine (LIDODERM) 5 % patch 1 patch, 1 patch, Topical, Daily, 1 patch at 10/05/19 0044    lidocaine (LIDODERM) 5 % patch 1 patch, 1 patch, Topical, Daily, 1 patch at 10/05/19 1017    methocarbamol (ROBAXIN) tablet 750 mg, 750 mg, Oral, Q6H [address], 750 mg at 10/05/19 0615    metoprolol succinate (TOPROL-XL) 24 hr tablet 50 mg, 50 mg, Oral, Daily, 50 mg at 10/05/19 0909    ondansetron (ZOFRAN) injection 4 mg, 4 mg, Intravenous, Q6H PRN    oxyCODONE (ROXICODONE) immediate release tablet 10 mg, 10 mg, Oral, Q4H PRN, 10 mg at 10/05/19 0914    oxyCODONE (ROXICODONE) IR tablet 5 mg, 5 mg, Oral, Q4H PRN    pantoprazole (PROTONIX) EC tablet 40 mg, 40 mg, Oral, Early Morning, 40 mg at 10/05/19 0615    senna (SENOKOT) tablet 17 2 mg, 2 tablet, Oral, Daily, 17 2 mg at 10/05/19 0908      OBJECTIVE:     Vitals:   Vitals:    10/05/19 0931   BP: 116/71   Pulse: 76   Resp: 16   Temp: 97 9 °F (36 6 °C)   SpO2: 95%       Physical Exam:   GENERAL APPEARANCE: Patient in no acute distress  HEENT: NCAT; PERRL, EOMs intact; Mucous membranes moist  NECK / BACK:   No midline cervical, thoracic or lumbar spine tenderness  There was some mild to moderate right lateral mid back/posterior chest wall tenderness without crepitus or deformity  CV: Regular rate with an irregularly irregular rhythm; + S1, S2; no murmur/gallops/rubs appreciated  CHEST / LUNGS: Clear to auscultation; no wheezes/rales/rhonci; right lateral chest wall tenderness without crepitus or deformity    ABD: NABS; soft; non-distended; non-tender  EXT: +2 pulses bilaterally upper & lower extremities; no clubbing/cyanosis/edema; moving all 4 extremities with normal range of motion and no pain aside from right chest wall pain with range of motion in the right shoulder  NEURO: GCS 15; no focal neurologic deficits; neurovascularly intact  SKIN: Warm, dry and well perfused; no rash; no jaundice; superficial anterior right knee abrasion without tenderness  I/O:   I/O       10/03 0701 - 10/04 0700 10/04 0701 - 10/05 0700 10/05 0701 - 10/06 0700    P  O   300     Total Intake(mL/kg)  300 (2 8)     Urine (mL/kg/hr)  600     Total Output  600     Net  -300                  Invasive Devices: Invasive Devices     Peripheral Intravenous Line            Peripheral IV 10/04/19 Left Antecubital less than 1 day                  Imaging:   Xr Shoulder 2+ Views Right    Result Date: 10/5/2019  Impression: No acute osseous abnormality  Degenerative changes as described  Workstation performed: [id number]     Xr Hand 3+ Views Right    Result Date: 10/5/2019  Impression: No acute osseous abnormality  Degenerative changes as described  Workstation performed: [id number]     Trauma - Ct Chest Abdomen Pelvis W Contrast    Result Date: 10/4/2019  Impression: Multiple acute right-sided rib fractures as above  Otherwise no acute traumatic injury to the chest, abdomen, pelvis   Workstation performed: [id number]       Labs:   CBC:   Lab Results   Component Value Date    WBC 9 25 10/04/2019    HGB 13 7 10/04/2019    HCT 41 4 10/04/2019    MCV 93 10/04/2019     10/05/2019    MCH 30 6 10/04/2019    MCHC 33 1 10/04/2019    RDW 12 7 10/04/2019    MPV 9 8 10/05/2019    NRBC 0 10/04/2019     CMP:   Lab Results   Component Value Date     10/04/2019    CO2 29 10/04/2019    BUN 9 10/04/2019    CREATININE 1 20 10/04/2019    CALCIUM 9 0 10/04/2019    AST 20 10/04/2019    ALT 20 10/04/2019    ALKPHOS 83 10/04/2019    EGFR 67 10/04/2019     Coagulation:   Lab Results Component Value Date    INR 1 01 10/04/2019         Omega Sherwood PA-C  10/5/2019 07:46 AM

## 2019-10-05 NOTE — ASSESSMENT & PLAN NOTE
- Multiple acute right-sided rib fractures (3, 4, 5, & 8)  - Continue rib fracture protocol     - Continue multimodal analgesic regimen     - Continue to encourage incentive spirometer use and adequate pulmonary hygiene  - Repeat chest x-ray on 10/5/2019 reviewed  - Will need outpatient follow-up in the trauma clinic in 2 weeks

## 2019-10-05 NOTE — SOCIAL WORK
CM met with patient and wife Briana Dawn [phone] at bedside to review CM role and discuss d/c plan  Pt resides with his wife in a 2-story home with 1 [address]LAYTON  Pt was independent with ADLs and ambulation PTA  No history of HHC or STR  Preferred Rx:  [facility]  No history of MH or D/A treatment reported  Patient recommended for RW  Referral placed in [facility]  CM delivered RW to Pt's room  CM reviewed d/c planning process including the following: identifying help at home, patient preference for d/c planning needs, Discharge Lounge, [facility] Meds to Bed program, availability of treatment team to discuss questions or concerns patient and/or family may have regarding understanding medications and recognizing signs and symptoms once discharged  CM also encouraged patient to follow up with all recommended appointments after discharge  Patient advised of importance for patient and family to participate in managing patients medical well being

## 2019-10-05 NOTE — OCCUPATIONAL THERAPY NOTE
[address]  Evaluation     Patient Name: Filemon HAYNES Date: 10/5/2019  Problem List  Principal Problem:    Multiple closed fractures of ribs of right side  Active Problems:    Fall    Past Medical History  Past Medical History:   Diagnosis Date    Atrial fibrillation ([address] )     Cervicalgia     ONSET 30OCT2008    Graham Whyte infection     GERD (gastroesophageal reflux disease)     Hypersomnia with sleep apnea     RESOLVED [id number]     Past Surgical History  Past Surgical History:   Procedure Laterality Date    APPENDECTOMY      CERVICAL FUSION      CHOLECYSTECTOMY      COLONOSCOPY  2009    KNEE SURGERY      LAPAROSCOPIC GASTRIC BANDING      gastric sleeve    SHOULDER SURGERY          10/05/19 1029   Note Type   Note type Eval only   Restrictions/Precautions   Weight Bearing Precautions Per Order No   Other Precautions Pain; Fall Risk;Telemetry;Multiple lines   Pain Assessment   Pain Assessment 0-10   Pain Score Worst Possible Pain   Pain Type Acute pain   Pain Location Rib cage   Pain Orientation Right   Home Living   Type of Home House  [facility] with 2STE)   Home Layout Two level;Bed/bath upstairs;1/2 bath on main level   Bathroom Toilet Standard   Bathroom Equipment   (no bathroom DME PTA)   Home Equipment   (no use of DME PTA)   Additional Comments Pt resides with his wife in [facility] with 2STE  Pt's wife is home and able to assist as needed upon d/c  Prior Function   Level of [place] Independent with ADLs and functional mobility   Lives With Spouse   Receives Help From Family   ADL Assistance Independent   IADLs Independent   Falls in the last 6 months   (1 fall (reason for admission))   Vocational Full time employment   Lifestyle   Autonomy Pt reports being completely I with ADLs, IADLs, and functional mobility without use of DME PTA  Pt is a   Reciprocal Relationships supportive wife   Service to 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    Typically drives to work for the week and is home on the weekend  [employer] plays guitar   Psychosocial   Psychosocial (WDL) WDL   Subjective   Subjective Spoke with pt and pt's wife regarding d/c OT services, OT d/c recommendation, and shower chair recommendation  Both receptive and reporting that they are comfortable d/c home with wife assisting pt as needed   ADL   Eating Assistance 7  Independent   Grooming Assistance 5  Supervision/Setup   UB [address] 5  Supervision/Setup   LB [address] 2  Maximal Assistance   LB Dressing Deficit Don/doff R sock; Don/doff L sock   Toileting Assistance  5  Supervision/Setup   Additional Comments LB ADLs limited by rib pain with trunk flexion   Bed Mobility   Additional Comments Pt seated EOB upon therapist entry and bed mobility not assessed during initial OT evaluation  Pt educated on log rolling into sitting position to decrease rib pain during recovery   Transfers   Sit to Stand 5  Supervision   Stand to Sit 5  Supervision   Functional Mobility   Functional Mobility 5  Supervision   Additional Comments with use of rw  No major LOB   Balance   Static Sitting Good   Dynamic Sitting Good   Static Standing Fair +   Dynamic Standing Fair   Ambulatory Fair   Activity Tolerance   Activity Tolerance Patient tolerated treatment well;Patient limited by pain   Medical Staff Made Aware PT EPIFANIO ovalle, trauma PA   Nurse Made Aware Pt cleared by RN Claire Gracia for OT evaluation and OOB mobility   RUE Assessment   RUE Assessment X  (limited by pain)   LUE Assessment   LUE Assessment WFL   Hand Function   Gross Motor Coordination Functional   Fine Motor Coordination Functional   Sensation   Light Touch No apparent deficits   Cognition   Overall Cognitive Status WFL   Arousal/Participation Alert; Cooperative;Responsive   Attention Within functional limits   Orientation Level Oriented X4   Memory Within functional limits   Following Commands Follows all commands and directions without difficulty   Comments Pt is pleasant and cooperative throughout OT evaluation  No significant cognitive deficits noted during OT evaluation   Assessment   Prognosis Good   Assessment Pt is a R hand dominant 62year old male seen for initial OT evaluation s/p admission to [facility] 10/4/19 s/p fall down 5 steps onto R side  Pt with multiple R sided rib fractures, R hand and shoulder pain  Imaging of R UE negative for acute fractures  (-) head strike  PMHx includes: atrial fibrillation, cervicalgia, clara barr infection, GERD, and hypersomnia with sleep apnea  Pt with active OT orders and cleared by NISHANT Alfaro for OT evaluation and OOB mobility  Pt resides with his wife in a [facility] with 2STE  Pt's wife is home and able to provide assistance as necessary  Pt reports being completely I with ADLs, IADLs, and functional mobility without use of DME PTA  Pt is currently functioning at/close to his functional baseline, and is primarily limited by pain  From an OT standpoint, recommend home with family support and a shower chair upon d/c  No further acute OT needs and OT orders to be d/c at this time  Please re-consult OT if medically and clinically appropriate     Goals   Patient Goals to go home for his birthday party tonight    Recommendation   OT Discharge Recommendation Home with family support   Equipment Recommended Tub seat with back   OT - OK to Discharge Yes  (when medically stable)   Barthel Index   Feeding 10   Bathing 0   Grooming Score 0   Dressing Score 5   Bladder Score 10   Bowels Score 10   Toilet Use Score 10   Transfers (Bed/Chair) Score 10   Mobility (Level Surface) Score 10   Stairs Score 5   Barthel Index Score 70     Sarina Romo, DAMASO, OTR/L

## 2019-10-05 NOTE — RESPIRATORY THERAPY NOTE
RT Protocol Note  Jewell Bender 62 y o  male MRN: [id number]  Unit/Bed#: [facility] 606-01 Encounter: [id number]    Assessment    Active Problems:    Multiple closed fractures of ribs of right side      Home Pulmonary Medications:  NA       Past Medical History:   Diagnosis Date    Atrial fibrillation ([address] )     Cervicalgia     ONSET 30OCT2008    Graham Whyte infection     GERD (gastroesophageal reflux disease)     Hypersomnia with sleep apnea     RESOLVED [id number]     Social History     Socioeconomic History    Marital status: /Civil Union     Spouse name: None    Number of children: None    Years of education: None    Highest education level: None   Occupational History    None   Social Needs    Financial resource strain: None    Food insecurity:     Worry: None     Inability: None    Transportation needs:     Medical: None     Non-medical: None   Tobacco Use    Smoking status: Current Every Day Smoker     Packs/day: 1 00     Years: 20 00     Pack years: 20 00     Types: E-Cigarettes    Smokeless tobacco: Never Used   Substance and Sexual Activity    Alcohol use: No    Drug use: No    Sexual activity: None   Lifestyle    Physical activity:     Days per week: None     Minutes per session: None    Stress: None   Relationships    Social connections:     Talks on phone: None     Gets together: None     Attends [employer] service: None     Active member of club or organization: None     Attends meetings of clubs or organizations: None     Relationship status: None    Intimate partner violence:     Fear of current or ex partner: None     Emotionally abused: None     Physically abused: None     Forced sexual activity: None   Other Topics Concern    None   Social History Narrative    PLAYS BASKETBALL 2X/WEEK     LACK OF ADEQUATE SLEEP       Subjective         Objective    Physical Exam:   Assessment Type: Assess only  General Appearance: Awake  Respiratory Pattern: Normal  Chest Assessment: Chest expansion symmetrical  Bilateral Breath Sounds: Clear    Vitals:  Blood pressure 135/86, pulse 88, temperature 99 9 °F (37 7 °C), resp  rate 18, height 5' 7" (1 702 m), weight 107 kg (235 lb), SpO2 97 %  Imaging and other studies: I have personally reviewed pertinent reports  Plan       Airway Clearance Plan: Incentive Spirometer     Resp Comments: Pt evaluated per airway clearance protocol  Pt does not have any respiratory history  No home respiratory medications  Pt has multiple fractured ribs  Instructed pt on incentive spirometer  Pt able to get 2500  Will continue to follow for 24 hrs

## 2019-10-05 NOTE — ASSESSMENT & PLAN NOTE
- Multiple acute right-sided rib fractures (3, 4, 5, & 8)  - Continue rib fracture protocol     - Continue multimodal analgesic regimen     - Continue to encourage incentive spirometer use and adequate pulmonary hygiene  - Follow-up repeat chest x-ray today   - PT and OT evaluation and treatment as indicated  - Will need outpatient follow-up in the trauma clinic in 2 weeks

## 2019-10-05 NOTE — TELEPHONE ENCOUNTER
Jeni Pope 1965  CONFIDENTIALTY NOTICE: This fax transmission is intended only for the addressee  It contains information that is legally privileged,  confidential or otherwise protected from use or disclosure  If you are not the intended recipient, you are strictly prohibited from reviewing,  disclosing, copying using or disseminating any of this information or taking any action in reliance on or regarding this information  If you have  received this fax in error, please notify us immediately by telephone so that we can arrange for its return to us  Page:   Call Id: [id number]  Health Call  Standard Call Report  Health Call  Patient Name: Jeni Pope  Gender: Female  : 1965  Age: 62 Y 3 D  Return Phone  Number: [phone] (Cell)  Address: [place] [address]   City/State/Zip: [facility] [address]  Practice Name: Cindy Mireles Charged:  Physician:  Nunu Sampson Name: Filemon Granados  Relationship To  Patient: Spouse  Return Phone Number: [phone] (Cell)  Presenting Problem: "My  medication is being  denied by the pharmacy , we need a  different script "  Service Type: Messages  Charged Service 1: N/A  Pharmacy Name and  Number:  Nurse Assessment  Protocols  Protocol Title Nurse Date/Time  Disp  Time Disposition Final User  10/5/2019 5:43:39 PM Close Yes Sue Pisano  Comments  User: Hector Olmos RN Date/Time: 10/5/2019 5:43:05 PM  Patient was unable to get his Oxycodone immediate release tablets today because insurance does not want to release it  Stated  that patient sees Pain Managment and since Percocet has been given to the patient they will not give Oxy  Office protocol states  to call the Dept Cell phone if patient has been d/c within 48 hrs and having issues obtaining medications  Called dept cell and  reached Dr Kaelyn Bassett   Explained the situation and doctor stated she would call the [facility] to try and get this  straightened out  [facility] pharmacy phone number given to Dr Artemio Hinton

## 2019-10-05 NOTE — DISCHARGE SUMMARY
Discharge- David Adkins 1965, 62 y o  male MRN: [id number]    Unit/Bed#: [facility] 606-01 Encounter: [id number]    Primary Care Provider: Jose Maya MD   Date and time admitted to hospital: 10/4/2019  9:42 PM        Fall  Assessment & Plan  - Status post fall with the below noted injuries  - Fall precautions     - PT and OT evaluation and treatment as indicated  * Multiple closed fractures of ribs of right side  Assessment & Plan  - Multiple acute right-sided rib fractures (3, 4, 5, & 8)  - Continue rib fracture protocol     - Continue multimodal analgesic regimen     - Continue to encourage incentive spirometer use and adequate pulmonary hygiene  - Repeat chest x-ray on 10/5/2019 reviewed  - Will need outpatient follow-up in the trauma clinic in 2 weeks  Discharge Summary - Trauma Service   David Adkins 62 y o  male MRN: [id number]  Unit/Bed#: [facility] 606-01 Encounter: [id number]    Admission Date: 10/4/2019     Discharge Date: 10/5/2019     Admitting Diagnosis: Rib fractures [S22 39XA]    Discharge Diagnosis: See above  Attending and Service: Dr Raza Lopez, Acute Care Surgical Services  Consulting Physician(s): Acute Pain Service  Imaging and Procedures Performed:     Xr Shoulder 2+ Views Right    Result Date: 10/5/2019  Impression: No acute osseous abnormality  Degenerative changes as described  Workstation performed: [id number]     Xr Hand 3+ Views Right    Result Date: 10/5/2019  Impression: No acute osseous abnormality  Degenerative changes as described  Workstation performed: [id number]     Trauma - Ct Chest Abdomen Pelvis W Contrast    Result Date: 10/4/2019  Impression: Multiple acute right-sided rib fractures as above  Otherwise no acute traumatic injury to the chest, abdomen, pelvis   Workstation performed: [facility] [facility] [facility] Course: David Adkins is a 68-year-old   Male who initially presented to [address] [place] [address] following a fall down approximately 5 stairs landing on concrete  He struck his right side and back upon landing and was not able to ambulate without assistance after the fall  He felt immediate right-sided chest and back pain that was worsened with deep breaths  He denies hitting his head or losing consciousness  At [facility], he was found to have multiple right-sided rib fractures  He was transferred to [facility] for trauma evaluation  On his initial evaluation by the trauma service at [facility], his primary survey was unremarkable  On secondary survey, he was afebrile with normal vital signs; he was noted to have an irregularly irregular rhythm consistent with  Atrial fibrillation; he had tenderness over the right lateral chest wall posterior chest wall with no crepitus or deformity; he had an abrasion over the dorsal aspect of his right hand; the remainder of his secondary survey was unremarkable  His initial workup included labs and the above-noted imaging studies  He was admitted to the trauma service at [facility] following a fall with multiple right-sided rib fractures and acute on chronic pain as well as abrasions  He was placed on the rib fracture protocol, started on a multimodal analgesic regimen, instructed on incentive spirometer use and encouraged to perform adequate pulmonary hygiene, and the Acute Pain Service was consulted  By 10/05/2019, his pain was controlled with oral analgesic regimen and a repeat chest x-ray demonstrated no evidence of hemo or pneumothorax  PT and OT evaluated him included for discharge home with a rolling walker  He was deemed stable for discharge home on 10/05/2019  On discharge, the patient is instructed to follow-up with the patient's primary care provider to review the events of the patient's recent hospitalization   The patient is instructed to follow-up in the Trauma Clinic as scheduled on 10/24/2019 at 12:30 PM   The patient should follow the provided discharge instructions  Condition at Discharge: good     Discharge instructions/Information to patient and family:   See after visit summary for information provided to patient and family  Provisions for Follow-Up Care:  See after visit summary for information related to follow-up care and any pertinent home health orders  Disposition: See After Visit Summary for discharge disposition information  Planned Readmission: No    Discharge Statement   I spent 26 minutes discharging the patient  This time was spent on the day of discharge  I had direct contact with the patient on the day of discharge  Additional documentation is required if more than 30 minutes were spent on discharge  Discharge Medications:  See after visit summary for reconciled discharge medications provided to patient and family        Hong Subramanian PA-C  10/5/2019  12:40 PM

## 2019-10-05 NOTE — UTILIZATION REVIEW
Initial Clinical Review    Admission: Date/Time/Statement:    OBS  ORDER    10/4  @    2239  Orders Placed This Encounter   Procedures    Place in Observation     Standing Status:   Standing     Number of Occurrences:   1     Order Specific Question:   Admitting Physician     Answer:   Valerie Tatum [[id number]]     Order Specific Question:   Level of Care     Answer:   Med Surg [16]     ED Arrival Information     Expected Arrival Acuity Means of Arrival Escorted By Service Admission Type    10/4/2019  10/4/2019 21:42 Immediate - [facility]) Trauma Emergency    Arrival Complaint    Rib fractures        Chief Complaint   Patient presents with    Fall     Pt states he fell 4-5 steps landing on concrete -LOC -headstrike breaking 4 ribs on the R side     Assessment/Plan: 54M s/p fall off landing w multiple R rib fx  - admit to trauma  - rib fx protocol/multimodal pain regimen  - IS  - APS consultation --> p w h/o chronic pain  - tertiary in      Eyad Pandey is a 62 y o  male with pmhx atrial fibrillation on rate control medications, and chronic pain who presents as trauma transfer from [place] [address] to [facility] with multiple R-sided rib fractures after mechanical fall that occurred at home earlier this evening  Fell down 5 steps, landing on concrete below  [place] Globe[place] onto his R side  Non-ambulatory at the scene, required assistance  Felt immediate R sided chest wall pain, worse with deep inspiration  No LOC   No anticoagulation, no antiplatelet medications    ED Triage Vitals [10/04/19 2149]   Temperature Pulse Respirations Blood Pressure SpO2   98 7 °F (37 1 °C) 98 20 138/71 96 %      Temp Source Heart Rate Source Patient Position - Orthostatic VS BP Location FiO2 (%)   Oral Monitor Lying Right arm --      Pain Score       9        Wt Readings from Last 1 Encounters:   10/04/19 107 kg (235 lb)     Additional Vital Signs:   10/05/19 09:31:42  97 9 °F (36 6 °C)  76  16  116/71  86  95 %       10/05/19 0100           97 %       10/05/19 0027              None (Room air)     10/04/19 23:58:03  99 9 °F (37 7 °C)  88  18  135/86  102  94 %       10/04/19 23:57:32  99 9 °F (37 7 °C)    18  135/86  102         10/04/19 2250    116Abnormal   18  134/68    95 %       10/04/19 21:54:10  98 7 °F (37 1 °C)  102  20  138/71    96 %    Lying   10/04/19 2149  98 7 °F (37 1 °C)  98  20  138/71    96 %  None (Room air)  Lying         Pertinent Labs/Diagnostic Test Results:   Results from last 7 days   Lab Units 10/05/19  0439 10/04/19  1814   WBC Thousand/uL  --  9 25   HEMOGLOBIN g/dL  --  13 7   HEMATOCRIT %  --  41 4   PLATELETS Thousands/uL 205 237   NEUTROS ABS Thousands/µL  --  7 39         Results from last 7 days   Lab Units 10/04/19  1814   SODIUM mmol/L 139   POTASSIUM mmol/L 3 8   CHLORIDE mmol/L 103   CO2 mmol/L 29   ANION GAP mmol/L 7   BUN mg/dL 9   CREATININE mg/dL 1 20   EGFR ml/min/1 73sq m 67   CALCIUM mg/dL 9 0   MAGNESIUM mg/dL 1 9     Results from last 7 days   Lab Units 10/04/19  1814   AST U/L 20   ALT U/L 20   ALK PHOS U/L 83   TOTAL PROTEIN g/dL 6 8   ALBUMIN g/dL 3 7   TOTAL BILIRUBIN mg/dL 0 40         Results from last 7 days   Lab Units 10/04/19  1814   GLUCOSE RANDOM mg/dL 125           Results from last 7 days   Lab Units 10/04/19  1814   PROTIME seconds 10 9   INR  1 01   PTT seconds 23         X  ray  R  Shoulder/ R  Hand     ( 10/5)    No acute   Osseous  Abnormality  Ct  Chest/abd/pelvis  ( 10/4)      Multiple acute right-sided rib fractures as above  Otherwise no acute traumatic injury to the chest, abdomen, pelvis      ED Treatment:   Medication Administration from 10/04/2019 2126 to 10/04/2019 2353       Date/Time Order Dose Route Action Action by Comments     10/04/2019 7187 oxyCODONE (ROXICODONE) immediate release tablet 10 mg 10 mg Oral Given Janie Hemphill RN         Past Medical History:   Diagnosis Date    Atrial fibrillation ([address] )     Cervicalgia     ONSET [id number] Rachell Borrego infection     GERD (gastroesophageal reflux disease)     Hypersomnia with sleep apnea     RESOLVED [id number]     Present on Admission:   Multiple closed fractures of ribs of right side      Admitting Diagnosis: Rib fractures [S22 39XA]  Age/Sex: 62 y o  male  Admission Orders:    Current Facility-Administered Medications:  acetaminophen 975 mg Oral Q6H [address] Jearldine Latrell, DO   diltiazem 360 mg Oral Daily Jearldine Latrell, DO   docusate sodium 100 mg Oral BID Jearldine Latrell, DO   enoxaparin 30 mg Subcutaneous Q12H [address] Omega Sherwood PA-C   HYDROmorphone 0 5 mg Intravenous Q1H PRN Jearldine Latrell, DO   lidocaine 1 patch Topical Daily Jearldine Latrell, DO   lidocaine 1 patch Topical Daily Adams Bravo PA-C   methocarbamol 750 mg Oral Q6H [address] Jearldine Latrell, DO   metoprolol succinate 50 mg Oral Daily Jearldine Latrell, DO   ondansetron 4 mg Intravenous Q6H PRN Jearldine Latrell, DO   oxyCODONE 10 mg Oral Q4H PRN Jearldine Latrell, DO   oxyCODONE 5 mg Oral Q4H PRN Jearldine Latrell, DO   pantoprazole 40 mg Oral Early Morning Jearldine Latrell, DO   senna 2 tablet Oral Daily Jearldine Latrell, DO       IP CONSULT TO CASE MANAGEMENT  IP CONSULT TO Madelyn Chandra [facility] Review Department  Phone: [phone]; Fax [phone]  [email or web address]  ATTENTION: Please call with any questions or concerns to [phone]  and carefully listen to the prompts so that you are directed to the right person  Send all requests for admission clinical reviews, approved or denied determinations and any other requests to fax [phone]   All voicemails are confidential

## 2019-10-10 ENCOUNTER — TELEPHONE (OUTPATIENT)
Dept: OTHER | Facility: OTHER | Age: 54
End: 2019-10-10

## 2019-10-16 ENCOUNTER — TELEPHONE (OUTPATIENT)
Dept: FAMILY MEDICINE CLINIC | Facility: CLINIC | Age: 54
End: 2019-10-16

## 2019-10-24 ENCOUNTER — OFFICE VISIT (OUTPATIENT)
Dept: SURGERY | Facility: CLINIC | Age: 54
End: 2019-10-24
Payer: COMMERCIAL

## 2019-10-24 VITALS
DIASTOLIC BLOOD PRESSURE: 80 MMHG | WEIGHT: 231 LBS | TEMPERATURE: 97.5 F | BODY MASS INDEX: 36.26 KG/M2 | SYSTOLIC BLOOD PRESSURE: 130 MMHG | HEIGHT: 67 IN

## 2019-10-24 DIAGNOSIS — S22.49XA RIB FRACTURES: Primary | ICD-10-CM

## 2019-10-24 DIAGNOSIS — S22.41XA MULTIPLE CLOSED FRACTURES OF RIBS OF RIGHT SIDE: ICD-10-CM

## 2019-10-24 PROCEDURE — 99214 OFFICE O/P EST MOD 30 MIN: CPT | Performed by: SURGERY

## 2019-10-24 RX ORDER — METHOCARBAMOL 750 MG/1
750 TABLET, FILM COATED ORAL EVERY 6 HOURS PRN
Qty: 60 TABLET | Refills: 0 | Status: SHIPPED | OUTPATIENT
Start: 2019-10-24 | End: 2019-10-24 | Stop reason: CLARIF

## 2019-10-24 RX ORDER — GABAPENTIN 100 MG/1
100 CAPSULE ORAL 3 TIMES DAILY
Qty: 60 CAPSULE | Refills: 1 | Status: SHIPPED | OUTPATIENT
Start: 2019-10-24 | End: 2022-01-13 | Stop reason: ALTCHOICE

## 2019-10-24 RX ORDER — GABAPENTIN 100 MG/1
100 CAPSULE ORAL 3 TIMES DAILY
Qty: 60 CAPSULE | Refills: 0 | Status: SHIPPED | OUTPATIENT
Start: 2019-10-24 | End: 2019-10-24 | Stop reason: CLARIF

## 2019-10-24 RX ORDER — METHOCARBAMOL 750 MG/1
750 TABLET, FILM COATED ORAL EVERY 6 HOURS PRN
Qty: 60 TABLET | Refills: 0 | Status: SHIPPED | OUTPATIENT
Start: 2019-10-24 | End: 2022-01-13 | Stop reason: ALTCHOICE

## 2019-10-24 NOTE — LETTER
October 24, 2019     Patient: Pascual Cruz   YOB: 1965   Date of Visit: 10/24/2019       To Whom it May Concern:    Vernal Cutting is under my professional care  He was seen in my office on 10/24/2019  He should not return to work until cleared by trauma  Next appt is in 3 weeks  If you have any questions or concerns, please don't hesitate to call           Sincerely,          FABRICE TRAUMA PROVIDER        CC: No Recipients

## 2019-10-24 NOTE — PROGRESS NOTES
Office Visit - General Surgery  David Adkins MRN: [id number]  Encounter: [id number]    Assessment and Plan    Problem List Items Addressed This Visit        Musculoskeletal and Integument    Multiple closed fractures of ribs of right side     - continues to have persistent pain in his right chest wall from the rib fractures  - encouraged improved compliance with IS and pulmonary toilette  - add gabapentin 100 mg TID and ibuprofen 600 mg q6h to his scheduled methocarbamol and percocet  Patient takes percocet for chronic neck pain which he is prescribed through pain management  - no need for imaging today, oxygen saturations 98% on room air with no complaints of dyspnea on exertion or SOB   - f/u with trauma in 3 weeks for re-check and no working or driving until cleared (note given today)             Other Visit Diagnoses     Rib fractures    -  Primary    Relevant Medications    gabapentin (NEURONTIN) 100 mg capsule    methocarbamol (ROBAXIN) 750 mg tablet          Chief Complaint:  David Adkins is a 62 y o  male who presents for Follow-up (RIB FRACTURES)    Subjective  63 y/o male s/p fall down 5 stairs on 10/4 sustaining R 3, 4, 5 and 8th rib fractures is seen in clinic for f/u today  He admits to persistent chest wall pain on the right side that is still significant requiring the use of narcotic pain medications (percocet, which is prescribed by his chronic pain management physician)  He denies SOB/Dyspnea/lightheadedness/dizziness/fevers/chills/sweats  He is taking methocarbamol as an adjunct as well  He does feel like the percocet helps when he takes it but that he does not feel comfortable at all with taking a deep breath and therefore hs not been compliant with using his IS  He wonders when he will be cleared to return to work and driving   Currently he is in the process of relocating to [facility] and had been 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  His wife is present at the visit today as well       Past Medical History  Past Medical History:   Diagnosis Date    Atrial fibrillation ([address] )     Cervicalgia     ONSET [id number]    Graham Whyte infection     GERD (gastroesophageal reflux disease)     Hypersomnia with sleep apnea     RESOLVED [id number]       Past Surgical History  Past Surgical History:   Procedure Laterality Date    APPENDECTOMY      CERVICAL FUSION      CHOLECYSTECTOMY      COLONOSCOPY  2009    KNEE SURGERY      LAPAROSCOPIC GASTRIC BANDING      gastric sleeve    SHOULDER SURGERY         Family History  Family History   Problem Relation Age of Onset    Hyperlipidemia Mother     Hypertension Mother     Coronary artery disease Father     Hypertension Father     Stroke Father        Social History  Social History     Socioeconomic History    Marital status: /Civil Union     Spouse name: None    Number of children: None    Years of education: None    Highest education level: None   Occupational History    None   Social Needs    Financial resource strain: None    Food insecurity:     Worry: None     Inability: None    Transportation needs:     Medical: None     Non-medical: None   Tobacco Use    Smoking status: Former Smoker     Packs/day: 1 00     Years: 20 00     Pack years: 20 00     Types: E-Cigarettes    Smokeless tobacco: Never Used   Substance and Sexual Activity    Alcohol use: No    Drug use: No    Sexual activity: None   Lifestyle    Physical activity:     Days per week: None     Minutes per session: None    Stress: None   Relationships    Social connections:     Talks on phone: None     Gets together: None     Attends Church service: None     Active member of club or organization: None     Attends meetings of clubs or organizations: None     Relationship status: None    Intimate partner violence:     Fear of current or ex partner: None     Emotionally abused: None     Physically abused: None     Forced sexual activity: None   Other Topics Concern    None   Social History Narrative    PLAYS BASKETBALL 2X/WEEK     LACK OF ADEQUATE SLEEP        Medications  Current Outpatient Medications on File Prior to Visit   Medication Sig Dispense Refill    acetaminophen (TYLENOL) 325 mg tablet Take 3 tablets (975 mg total) by mouth every 8 (eight) hours 30 tablet 0    diltiazem (CARDIZEM CD) 360 MG 24 hr capsule Take 360 mg by mouth daily   methocarbamol (ROBAXIN) 750 mg tablet Take 1 tablet (750 mg total) by mouth every 6 (six) hours 40 tablet 1    metoprolol succinate (TOPROL-XL) 50 mg 24 hr tablet       oxyCODONE (ROXICODONE) 5 mg immediate release tablet Take 1-2 tablets (5-10 mg total) by mouth every 4 (four) hours as needed for moderate pain or severe painMax Daily Amount: 60 mg 45 tablet 0    pantoprazole (PROTONIX) 40 mg tablet Take 1 tablet (40 mg total) by mouth daily 30 tablet 0    tadalafil (CIALIS) 5 MG tablet Take 1 tablet (5 mg total) by mouth daily as needed for erectile dysfunction 6 tablet 0    ALPRAZolam (XANAX) 0 25 mg tablet Take 1 tablet by mouth      aspirin (ECOTRIN LOW STRENGTH) 81 mg EC tablet Take by mouth      clotrimazole-betamethasone (LOTRISONE) 1-0 05 % cream APPLY SPARINGLY TO AFFECTED AREA(S) THREE TIMES A DAY (Patient not taking: Reported on 10/4/2019) 45 g 6    dronedarone (MULTAQ) 400 mg tablet Take by mouth      gabapentin (NEURONTIN) 100 mg capsule Take 1 capsule (100 mg total) by mouth 3 (three) times a day for 14 days 42 capsule 0    meclizine (ANTIVERT) 25 mg tablet Take 1 tablet (25 mg total) by mouth 3 (three) times a day as needed for dizziness for up to 15 doses (Patient not taking: Reported on 10/4/2019) 15 tablet 0     No current facility-administered medications on file prior to visit  Allergies  No Known Allergies    Review of Systems   Constitutional: Negative  HENT: Negative  Eyes: Negative      Respiratory: Positive for shortness of breath  Negative for cough  Cardiovascular: Negative  Gastrointestinal: Negative  Negative for abdominal pain and constipation  Endocrine: Negative  Genitourinary: Negative  Musculoskeletal:        + right chest wall pain, worse with deep breathing and moving/coughing/lying down   Skin: Negative  Allergic/Immunologic: Negative  Neurological: Negative  Hematological: Negative  Psychiatric/Behavioral: Negative  Objective  Vitals:    10/24/19 1245   BP: 130/80   Temp: 97 5 °F (36 4 °C)       Physical Exam   Constitutional: He is oriented to person, place, and time  He appears well-developed and well-nourished  No distress  HENT:   Head: Normocephalic  Eyes: Pupils are equal, round, and reactive to light  Neck: Normal range of motion  Neck supple  Cardiovascular: Normal rate, regular rhythm and normal heart sounds  Pulmonary/Chest: Effort normal  No respiratory distress    + Decreased breath sounds in right base  Oxygen saturation 98% on room air   Abdominal: Soft  Bowel sounds are normal  He exhibits no distension  There is no tenderness  Musculoskeletal: Normal range of motion  He exhibits no edema or deformity  + R chest wall tenderness to palpation   Neurological: He is alert and oriented to person, place, and time  Skin: Skin is warm and dry  Capillary refill takes less than 2 seconds  No rash noted  He is not diaphoretic  No erythema  Psychiatric: He has a normal mood and affect   His behavior is normal  Judgment and thought content normal

## 2019-10-24 NOTE — ASSESSMENT & PLAN NOTE
- continues to have persistent pain in his right chest wall from the rib fractures  - encouraged improved compliance with IS and pulmonary toilette  - add gabapentin 100 mg TID and ibuprofen 600 mg q6h to his scheduled methocarbamol and percocet   Patient takes percocet for chronic neck pain which he is prescribed through pain management  - no need for imaging today, oxygen saturations 98% on room air with no complaints of dyspnea on exertion or SOB   - f/u with trauma in 3 weeks for re-check and no working or driving until cleared (note given today)

## 2019-11-14 ENCOUNTER — OFFICE VISIT (OUTPATIENT)
Dept: SURGERY | Facility: CLINIC | Age: 54
End: 2019-11-14
Payer: COMMERCIAL

## 2019-11-14 VITALS
WEIGHT: 231 LBS | SYSTOLIC BLOOD PRESSURE: 132 MMHG | HEART RATE: 83 BPM | HEIGHT: 67 IN | TEMPERATURE: 98 F | DIASTOLIC BLOOD PRESSURE: 78 MMHG | BODY MASS INDEX: 36.26 KG/M2

## 2019-11-14 DIAGNOSIS — S22.41XD CLOSED FRACTURE OF MULTIPLE RIBS OF RIGHT SIDE WITH ROUTINE HEALING, SUBSEQUENT ENCOUNTER: Primary | ICD-10-CM

## 2019-11-14 PROCEDURE — 99213 OFFICE O/P EST LOW 20 MIN: CPT | Performed by: SURGERY

## 2019-11-14 NOTE — ASSESSMENT & PLAN NOTE
- doing well at this time; saturation is 99% on room air  - symptoms have overall improved  - would not recommend a repeat chest x-ray  - however patient is still having splinting pain with some mobility as well as he has concerns over his ability to drive at this time specifically 5 hours to his current job  - would recommend that the patient follow up with the trauma team in 4 weeks at [address] [place] [address] to discuss return to work  - patient course for rib fractures typically takes 10-12 weeks to fully heal; so the patient would be approximately at the 10 week rhoda  - at the next follow-up appointment; patient should have discussion about return to work as well as return to driving  - patient states that he has had no setbacks  - patient should also discussed with provider at next appointment about return to [address] regarding her planes as he will be moving to [facility]

## 2019-11-14 NOTE — LETTER
November 14, 2019     Patient: Rhett Mcbride   YOB: 1965   Date of Visit: 11/14/2019       To Whom it May Concern:    Jackie Plummer is under my professional care  He was seen in my office on 11/14/2019  He will need a 4 week follow-up from the appointment on 11/14/19  Patient will require another visit to be evaluated to discuss return to work  Not cleared to return to driving  Will start doing some basic housework to build stamina  If you have any questions or concerns, please don't hesitate to call           Sincerely,      Joseline AVINA TRAUMA PROVIDER        CC: No Recipients

## 2019-11-14 NOTE — PROGRESS NOTES
Office Visit  - Trauma  Zachary De Dios MRN: [id number]  Encounter: [id number]    Assessment and Plan    Problem List Items Addressed This Visit        Musculoskeletal and Integument    Multiple closed fractures of ribs of right side - Primary     - doing well at this time; saturation is 99% on room air  - symptoms have overall improved  - would not recommend a repeat chest x-ray  - however patient is still having splinting pain with some mobility as well as he has concerns over his ability to drive at this time specifically 5 hours to his current job  - would recommend that the patient follow up with the trauma team in 4 weeks at [facility] to discuss return to work  - patient course for rib fractures typically takes 10-12 weeks to fully heal; so the patient would be approximately at the 10 week rhoda  - at the next follow-up appointment; patient should have discussion about return to work as well as return to driving  - patient states that he has had no setbacks  - patient should also discussed with provider at next appointment about return to [address] regarding her planes as he will be moving to [facility]             Disposition:  Follow-up in 4 weeks to discuss return to work  Also at that time please 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  Chief Complaint:  Zachary De Dios is a 62 y o  male who presents for Fall (f/u fall)    Subjective  Patient offering minimal complaints  No new chest pain or shortness of breath  Denies any nausea or vomiting  No fevers, chills, sweats  Reports that he is overall improved from his last visit; and he reports that he has not no major setbacks  Reports he continues to clinically improve daily  He reports that he has growing confidence that he will be able to return to driving soon      Past Medical History  Past Medical History:   Diagnosis Date    Atrial fibrillation [facility])     Cervicalgia     ONSET [id number]   [facility] infection     GERD (gastroesophageal reflux disease)     Hypersomnia with sleep apnea     RESOLVED [id number]       Past Surgical History  Past Surgical History:   Procedure Laterality Date    APPENDECTOMY      CERVICAL FUSION      CHOLECYSTECTOMY      COLONOSCOPY  2009    KNEE SURGERY      LAPAROSCOPIC GASTRIC BANDING      gastric sleeve    SHOULDER SURGERY         Family History  Family History   Problem Relation Age of Onset    Hyperlipidemia Mother     Hypertension Mother     Coronary artery disease Father     Hypertension Father     Stroke Father        Social History  Social History     Socioeconomic History    Marital status: /Civil Union     Spouse name: None    Number of children: None    Years of education: None    Highest education level: None   Occupational History    None   Social Needs    Financial resource strain: None    Food insecurity:     Worry: None     Inability: None    Transportation needs:     Medical: None     Non-medical: None   Tobacco Use    Smoking status: Former Smoker     Packs/day: 1 00     Years: 20 00     Pack years: 20 00     Types: E-Cigarettes    Smokeless tobacco: Never Used   Substance and Sexual Activity    Alcohol use: No    Drug use: No    Sexual activity: None   Lifestyle    Physical activity:     Days per week: None     Minutes per session: None    Stress: None   Relationships    Social connections:     Talks on phone: None     Gets together: None     Attends [employer] service: None     Active member of club or organization: None     Attends meetings of clubs or organizations: None     Relationship status: None    Intimate partner violence:     Fear of current or ex partner: None     Emotionally abused: None     Physically abused: None     Forced sexual activity: None   Other Topics Concern    None   Social History Narrative    PLAYS BASKETBALL 2X/WEEK     LACK OF ADEQUATE SLEEP        Medications  Current Outpatient Medications on File Prior to Visit   Medication Sig Dispense Refill    acetaminophen (TYLENOL) 325 mg tablet Take 3 tablets (975 mg total) by mouth every 8 (eight) hours 30 tablet 0    ALPRAZolam (XANAX) 0 25 mg tablet Take 1 tablet by mouth      aspirin (ECOTRIN LOW STRENGTH) 81 mg EC tablet Take by mouth      diltiazem (CARDIZEM CD) 360 MG 24 hr capsule Take 360 mg by mouth daily   dronedarone (MULTAQ) 400 mg tablet Take by mouth      gabapentin (NEURONTIN) 100 mg capsule Take 1 capsule (100 mg total) by mouth 3 (three) times a day 60 capsule 1    methocarbamol (ROBAXIN) 750 mg tablet Take 1 tablet (750 mg total) by mouth every 6 (six) hours 40 tablet 1    methocarbamol (ROBAXIN) 750 mg tablet Take 1 tablet (750 mg total) by mouth every 6 (six) hours as needed for muscle spasms 60 tablet 0    metoprolol succinate (TOPROL-XL) 50 mg 24 hr tablet       oxyCODONE (ROXICODONE) 5 mg immediate release tablet Take 1-2 tablets (5-10 mg total) by mouth every 4 (four) hours as needed for moderate pain or severe painMax Daily Amount: 60 mg 45 tablet 0    pantoprazole (PROTONIX) 40 mg tablet Take 1 tablet (40 mg total) by mouth daily 30 tablet 0    tadalafil (CIALIS) 5 MG tablet Take 1 tablet (5 mg total) by mouth daily as needed for erectile dysfunction 6 tablet 0    clotrimazole-betamethasone (LOTRISONE) 1-0 05 % cream APPLY SPARINGLY TO AFFECTED AREA(S) THREE TIMES A DAY (Patient not taking: Reported on 10/4/2019) 45 g 6    gabapentin (NEURONTIN) 100 mg capsule Take 1 capsule (100 mg total) by mouth 3 (three) times a day for 14 days 42 capsule 0    meclizine (ANTIVERT) 25 mg tablet Take 1 tablet (25 mg total) by mouth 3 (three) times a day as needed for dizziness for up to 15 doses (Patient not taking: Reported on 10/4/2019) 15 tablet 0     No current facility-administered medications on file prior to visit          Allergies  No Known Allergies    Review of Systems   Constitutional: Negative for activity change, appetite change and fever  HENT: Negative for ear discharge, ear pain, rhinorrhea, sore throat and trouble swallowing  Eyes: Negative for photophobia, pain and redness  Respiratory: Negative for apnea, cough, chest tightness, shortness of breath and stridor  Cardiovascular: Negative for chest pain and palpitations  Gastrointestinal: Negative for abdominal distention, abdominal pain, nausea and vomiting  Endocrine: Negative for cold intolerance and heat intolerance  Genitourinary: Negative  Musculoskeletal: Negative for arthralgias, back pain, neck pain and neck stiffness  Skin: Negative  Neurological: Negative for dizziness, weakness, light-headedness and numbness  Hematological: Negative  Objective  Vitals:    11/14/19 1245   BP: 132/78   Pulse: 83   Temp: 98 °F (36 7 °C)       Physical Exam   Constitutional: He appears well-developed and well-nourished  HENT:   Head: Normocephalic and atraumatic  Eyes: Pupils are equal, round, and reactive to light  EOM are normal    Neck: Normal range of motion  Neck supple  Cardiovascular: Normal rate, regular rhythm, normal heart sounds and intact distal pulses  Pulmonary/Chest: Effort normal and breath sounds normal  No stridor  No respiratory distress  Abdominal: Soft  Bowel sounds are normal  He exhibits no distension  There is no tenderness  Musculoskeletal: Normal range of motion  He exhibits no edema or deformity  Neurological: He is alert  Skin: Skin is warm and dry  Vitals reviewed

## 2019-12-17 ENCOUNTER — OFFICE VISIT (OUTPATIENT)
Dept: SURGERY | Facility: CLINIC | Age: 54
End: 2019-12-17
Payer: COMMERCIAL

## 2019-12-17 VITALS
DIASTOLIC BLOOD PRESSURE: 78 MMHG | HEART RATE: 92 BPM | WEIGHT: 230.2 LBS | TEMPERATURE: 97.1 F | SYSTOLIC BLOOD PRESSURE: 132 MMHG | HEIGHT: 67 IN | BODY MASS INDEX: 36.13 KG/M2

## 2019-12-17 DIAGNOSIS — S22.49XA MULTIPLE RIB FRACTURES: Primary | ICD-10-CM

## 2019-12-17 PROCEDURE — 99204 OFFICE O/P NEW MOD 45 MIN: CPT | Performed by: SURGERY

## 2019-12-17 NOTE — PROGRESS NOTES
[address] [place] [address] [facility] History and Physical Note:    Assessment:  History of multiple right rib fractures  History of chronic narcotic use    Plan:  Patient was seen twice in the Trauma surgery Office [place] [facility], 24 October and 14 November  Patient was told to follow up with [facility] team in 4 weeks at [facility] to discuss return to work  There is no trauma team at [facility]  Patient is to follow up with the Trauma division at [facility] for this concern  I stressed that there would be no more narcotics prescribed or needed for rib fractures that are now 3month-old    Chief Complaint:      HPI  Status post fall 4 October 2019 with multiple right-sided rib fractures  Patient treated at [facility] with pain control and incentive spirometry  Patient was discharged the following day, 5 October  Patient was seen twice in the Trauma surgery Office at [facility], 24 October and 14 November  Patient was told to follow up with [facility] team in 4 weeks at [facility] to discuss return to [facility]  Patient desires another 2 weeks off from work  He states that he is unable to drive the distance required      PMH:  Past Medical History:   Diagnosis Date    Atrial fibrillation ([address] )     Cervicalgia     ONSET 30OCT2008    Graham Whyte infection     GERD (gastroesophageal reflux disease)     Hypersomnia with sleep apnea     RESOLVED [id number]       PSH:  Past Surgical History:   Procedure Laterality Date    APPENDECTOMY      CERVICAL FUSION      CHOLECYSTECTOMY      COLONOSCOPY  2009    KNEE SURGERY      LAPAROSCOPIC GASTRIC BANDING      gastric sleeve    SHOULDER SURGERY         Home Meds:  Current Outpatient Medications on File Prior to Visit   Medication Sig Dispense Refill    acetaminophen (TYLENOL) 325 mg tablet Take 3 tablets (975 mg total) by mouth every 8 (eight) hours 30 tablet 0    ALPRAZolam (XANAX) 0 25 mg tablet Take 1 tablet by mouth      aspirin (ECOTRIN LOW STRENGTH) 81 mg EC tablet Take by mouth      clotrimazole-betamethasone (LOTRISONE) 1-0 05 % cream APPLY SPARINGLY TO AFFECTED AREA(S) THREE TIMES A DAY (Patient not taking: Reported on 10/4/2019) 45 g 6    diltiazem (CARDIZEM CD) 360 MG 24 hr capsule Take 360 mg by mouth daily   dronedarone (MULTAQ) 400 mg tablet Take by mouth      gabapentin (NEURONTIN) 100 mg capsule Take 1 capsule (100 mg total) by mouth 3 (three) times a day for 14 days 42 capsule 0    gabapentin (NEURONTIN) 100 mg capsule Take 1 capsule (100 mg total) by mouth 3 (three) times a day 60 capsule 1    meclizine (ANTIVERT) 25 mg tablet Take 1 tablet (25 mg total) by mouth 3 (three) times a day as needed for dizziness for up to 15 doses (Patient not taking: Reported on 10/4/2019) 15 tablet 0    methocarbamol (ROBAXIN) 750 mg tablet Take 1 tablet (750 mg total) by mouth every 6 (six) hours 40 tablet 1    methocarbamol (ROBAXIN) 750 mg tablet Take 1 tablet (750 mg total) by mouth every 6 (six) hours as needed for muscle spasms 60 tablet 0    metoprolol succinate (TOPROL-XL) 50 mg 24 hr tablet       oxyCODONE (ROXICODONE) 5 mg immediate release tablet Take 1-2 tablets (5-10 mg total) by mouth every 4 (four) hours as needed for moderate pain or severe painMax Daily Amount: 60 mg 45 tablet 0    pantoprazole (PROTONIX) 40 mg tablet Take 1 tablet (40 mg total) by mouth daily 30 tablet 0    tadalafil (CIALIS) 5 MG tablet Take 1 tablet (5 mg total) by mouth daily as needed for erectile dysfunction 6 tablet 0     No current facility-administered medications on file prior to visit          Allergies:  No Known Allergies    Social Hx:  Social History     Socioeconomic History    Marital status: /Civil Union     Spouse name: Not on file    Number of children: Not on file    Years of education: Not on file    Highest education level: Not on file   Occupational History    Not on file   Social Needs    Financial resource strain: Not on file    Food insecurity:     Worry: Not on file     Inability: Not on file    Transportation needs:     Medical: Not on file     Non-medical: Not on file   Tobacco Use    Smoking status: Former Smoker     Packs/day: 1 00     Years: 20 00     Pack years: 20 00     Types: E-Cigarettes    Smokeless tobacco: Never Used   Substance and Sexual Activity    Alcohol use: No    Drug use: No    Sexual activity: Not on file   Lifestyle    Physical activity:     Days per week: Not on file     Minutes per session: Not on file    Stress: Not on file   Relationships    Social connections:     Talks on phone: Not on file     Gets together: Not on file     Attends Gnosticist service: Not on file     Active member of club or organization: Not on file     Attends meetings of clubs or organizations: Not on file     Relationship status: Not on file    Intimate partner violence:     Fear of current or ex partner: Not on file     Emotionally abused: Not on file     Physically abused: Not on file     Forced sexual activity: Not on file   Other Topics Concern    Not on file   Social History Narrative    PLAYS BASKETBALL 2X/WEEK     LACK OF ADEQUATE SLEEP        Family Hx:    Family History   Problem Relation Age of Onset    Hyperlipidemia Mother     Hypertension Mother     Coronary artery disease Father     Hypertension Father     Stroke Father          Review of Systems   Constitutional: Negative  HENT: Negative  Eyes: Negative  Respiratory:        See HPI   Cardiovascular: Negative  Gastrointestinal: Negative  Endocrine: Negative  Genitourinary: Negative  Musculoskeletal:        Chronic back pain for which narcotics are prescribed   Skin: Negative  Allergic/Immunologic: Negative  Neurological: Negative  Hematological: Negative  Psychiatric/Behavioral: Negative  There were no vitals taken for this visit  Physical Exam   Constitutional: He is oriented to person, place, and time   He appears well-developed and well-nourished  No distress  HENT:   Head: Normocephalic and atraumatic  Eyes: Pupils are equal, round, and reactive to light  EOM are normal    Neck: Normal range of motion  Neck supple  Cardiovascular: Normal rate and regular rhythm  No murmur heard  Pulmonary/Chest: Effort normal and breath sounds normal  No respiratory distress  Mild right-sided chest wall discomfort   Abdominal: Soft  Bowel sounds are normal  He exhibits no distension  Musculoskeletal:   Mild ambulatory dysfunction from his back and left lower extremity   Lymphadenopathy:     He has no cervical adenopathy  Neurological: He is alert and oriented to person, place, and time  Skin: Skin is warm and dry  Psychiatric: He has a normal mood and affect   His behavior is normal        Pertinent labs reviewed    Pertinent images and available reads personally reviewed  I reviewed the CT scan from his admission to trauma service as well as post admit day 1 chest x-ray  Pertinent notes reviewed  I reviewed the initial trauma assessment, trauma discharge summary and 2 outpatient trauma notes     Maninder Jaimes MD [id number] [facility]  [phone]

## 2020-01-02 ENCOUNTER — OFFICE VISIT (OUTPATIENT)
Dept: SURGERY | Facility: CLINIC | Age: 55
End: 2020-01-02
Payer: COMMERCIAL

## 2020-01-02 VITALS
HEART RATE: 85 BPM | WEIGHT: 232 LBS | SYSTOLIC BLOOD PRESSURE: 138 MMHG | BODY MASS INDEX: 36.41 KG/M2 | HEIGHT: 67 IN | DIASTOLIC BLOOD PRESSURE: 80 MMHG | TEMPERATURE: 97.7 F

## 2020-01-02 DIAGNOSIS — S22.41XD CLOSED FRACTURE OF MULTIPLE RIBS OF RIGHT SIDE WITH ROUTINE HEALING, SUBSEQUENT ENCOUNTER: Primary | ICD-10-CM

## 2020-01-02 PROCEDURE — 99213 OFFICE O/P EST LOW 20 MIN: CPT | Performed by: SURGERY

## 2020-01-02 NOTE — PROGRESS NOTES
Office Visit - trauma  Samaria Enamorado MRN: [id number]  Encounter: [id number]    Assessment and Plan    Problem List Items Addressed This Visit        Musculoskeletal and Integument    Multiple closed fractures of ribs of right side - Primary     - cleared to return to work  - discharged from Trauma service  - note written for patient to return 01/06/2020  - no further workup at this time  - told to call back with questions or concerns               Disposition:  DC from Trauma service  Chief Complaint:  Samaria Enamorado is a 62 y o  male who presents for Fall (fall)    Subjective  Patient offering no new complaints today on presentation  Denies any new pain  Denies any new shortness of breath  Denies any new cough or congestion  Reports he is up and ambulatory  Denies any new nausea or vomiting  No fevers, chills, sweats  Reports he is ready to go back to work      Past Medical History  Past Medical History:   Diagnosis Date    Atrial fibrillation ([facility] )     Cervicalgia     ONSET 30OCT2008    Graham Whyte infection     GERD (gastroesophageal reflux disease)     Hypersomnia with sleep apnea     RESOLVED [id number]       Past Surgical History  Past Surgical History:   Procedure Laterality Date    APPENDECTOMY      CERVICAL FUSION      CHOLECYSTECTOMY      COLONOSCOPY  2009    KNEE SURGERY      LAPAROSCOPIC GASTRIC BANDING      gastric sleeve    SHOULDER SURGERY         Family History  Family History   Problem Relation Age of Onset    Hyperlipidemia Mother     Hypertension Mother     Coronary artery disease Father     Hypertension Father     Stroke Father        Social History  Social History     Socioeconomic History    Marital status: /Civil Union     Spouse name: None    Number of children: None    Years of education: None    Highest education level: None   Occupational History    None   Social Needs    Financial resource strain: None    Food insecurity:     Worry: None Inability: None    Transportation needs:     Medical: None     Non-medical: None   Tobacco Use    Smoking status: Former Smoker     Packs/day: 1 00     Years: 20 00     Pack years: 20 00     Types: E-Cigarettes    Smokeless tobacco: Never Used   Substance and Sexual Activity    Alcohol use: No    Drug use: No    Sexual activity: None   Lifestyle    Physical activity:     Days per week: None     Minutes per session: None    Stress: None   Relationships    Social connections:     Talks on phone: None     Gets together: None     Attends Restoration service: None     Active member of club or organization: None     Attends meetings of clubs or organizations: None     Relationship status: None    Intimate partner violence:     Fear of current or ex partner: None     Emotionally abused: None     Physically abused: None     Forced sexual activity: None   Other Topics Concern    None   Social History Narrative    PLAYS BASKETBALL 2X/WEEK     LACK OF ADEQUATE SLEEP        Medications  Current Outpatient Medications on File Prior to Visit   Medication Sig Dispense Refill    diltiazem (CARDIZEM CD) 360 MG 24 hr capsule Take 360 mg by mouth daily        metoprolol succinate (TOPROL-XL) 50 mg 24 hr tablet       oxyCODONE (ROXICODONE) 5 mg immediate release tablet Take 1-2 tablets (5-10 mg total) by mouth every 4 (four) hours as needed for moderate pain or severe painMax Daily Amount: 60 mg 45 tablet 0    pantoprazole (PROTONIX) 40 mg tablet Take 1 tablet (40 mg total) by mouth daily 30 tablet 0    tadalafil (CIALIS) 5 MG tablet Take 1 tablet (5 mg total) by mouth daily as needed for erectile dysfunction 6 tablet 0    acetaminophen (TYLENOL) 325 mg tablet Take 3 tablets (975 mg total) by mouth every 8 (eight) hours (Patient not taking: Reported on 12/17/2019) 30 tablet 0    ALPRAZolam (XANAX) 0 25 mg tablet Take 1 tablet by mouth      aspirin (ECOTRIN LOW STRENGTH) 81 mg EC tablet Take by mouth      clotrimazole-betamethasone (LOTRISONE) 1-0 05 % cream APPLY SPARINGLY TO AFFECTED AREA(S) THREE TIMES A DAY (Patient not taking: Reported on 10/4/2019) 45 g 6    dronedarone (MULTAQ) 400 mg tablet Take by mouth      gabapentin (NEURONTIN) 100 mg capsule Take 1 capsule (100 mg total) by mouth 3 (three) times a day for 14 days 42 capsule 0    gabapentin (NEURONTIN) 100 mg capsule Take 1 capsule (100 mg total) by mouth 3 (three) times a day (Patient not taking: Reported on 1/2/2020) 60 capsule 1    meclizine (ANTIVERT) 25 mg tablet Take 1 tablet (25 mg total) by mouth 3 (three) times a day as needed for dizziness for up to 15 doses (Patient not taking: Reported on 10/4/2019) 15 tablet 0    methocarbamol (ROBAXIN) 750 mg tablet Take 1 tablet (750 mg total) by mouth every 6 (six) hours (Patient not taking: Reported on 1/2/2020) 40 tablet 1    methocarbamol (ROBAXIN) 750 mg tablet Take 1 tablet (750 mg total) by mouth every 6 (six) hours as needed for muscle spasms (Patient not taking: Reported on 1/2/2020) 60 tablet 0     No current facility-administered medications on file prior to visit  Allergies  No Known Allergies    Review of Systems   Constitutional: Negative for activity change, appetite change and fever  HENT: Negative for ear discharge, ear pain, rhinorrhea, sore throat and trouble swallowing  Eyes: Negative for photophobia, pain and redness  Respiratory: Negative for apnea, cough, chest tightness, shortness of breath and stridor  Cardiovascular: Negative for chest pain and palpitations  Gastrointestinal: Negative for abdominal distention, abdominal pain, nausea and vomiting  Endocrine: Negative for cold intolerance and heat intolerance  Genitourinary: Negative  Musculoskeletal: Negative for arthralgias, back pain, neck pain and neck stiffness  Skin: Negative  Neurological: Negative for dizziness, weakness, light-headedness and numbness  Hematological: Negative  Objective  Vitals:    01/02/20 1503   BP: 138/80   Pulse: 85   Temp: 97 7 °F (36 5 °C)       Physical Exam   Constitutional: He is oriented to person, place, and time  He appears well-developed and well-nourished  HENT:   Head: Normocephalic and atraumatic  Eyes: Pupils are equal, round, and reactive to light  EOM are normal    Neck: Normal range of motion  Neck supple  Cardiovascular: Normal rate, regular rhythm, normal heart sounds and intact distal pulses  Pulmonary/Chest: Effort normal and breath sounds normal    Abdominal: Soft  Bowel sounds are normal  He exhibits no distension  There is no tenderness  Musculoskeletal: Normal range of motion  He exhibits no edema or deformity  Neurological: He is alert and oriented to person, place, and time  No cranial nerve deficit  Skin: Skin is warm and dry  Vitals reviewed

## 2020-01-02 NOTE — PATIENT INSTRUCTIONS
Rib Fractures:   Seek medical attn if you develop worsening chest pain or shortness of breath, dizziness/lightheadness, fevers/chills or sweats  No strenuous physical activity  May return to work on 1/6/19

## 2020-01-02 NOTE — ASSESSMENT & PLAN NOTE
- cleared to return to work  - discharged from [address]  - note written for patient to return 01/06/2020  - no further workup at this time  - told to call back with questions or concerns

## 2020-01-02 NOTE — LETTER
January 2, 2020     Patient: Bro Oneal   YOB: 1965   Date of Visit: 1/2/2020       To Whom it May Concern:    Juvenal Ford is under my professional care  He was seen in my office on 1/2/2020  He may return to work on 1/6/20  Patient can return without any restrictions       If you have any questions or concerns, please don't hesitate to call           Sincerely,      Olga AVINA TRAUMA PROVIDER        CC: No Recipients

## 2022-01-13 ENCOUNTER — OFFICE VISIT (OUTPATIENT)
Dept: URGENT CARE | Facility: CLINIC | Age: 57
End: 2022-01-13
Payer: COMMERCIAL

## 2022-01-13 VITALS — TEMPERATURE: 97.3 F | HEART RATE: 98 BPM | OXYGEN SATURATION: 98 % | RESPIRATION RATE: 14 BRPM

## 2022-01-13 DIAGNOSIS — Z20.822 SUSPECTED COVID-19 VIRUS INFECTION: Primary | ICD-10-CM

## 2022-01-13 DIAGNOSIS — I48.20 CHRONIC ATRIAL FIBRILLATION (HCC): ICD-10-CM

## 2022-01-13 DIAGNOSIS — J34.89 SINUS PRESSURE: ICD-10-CM

## 2022-01-13 PROCEDURE — U0003 INFECTIOUS AGENT DETECTION BY NUCLEIC ACID (DNA OR RNA); SEVERE ACUTE RESPIRATORY SYNDROME CORONAVIRUS 2 (SARS-COV-2) (CORONAVIRUS DISEASE [COVID-19]), AMPLIFIED PROBE TECHNIQUE, MAKING USE OF HIGH THROUGHPUT TECHNOLOGIES AS DESCRIBED BY CMS-2020-01-R: HCPCS | Performed by: PHYSICIAN ASSISTANT

## 2022-01-13 PROCEDURE — 99204 OFFICE O/P NEW MOD 45 MIN: CPT | Performed by: PHYSICIAN ASSISTANT

## 2022-01-13 RX ORDER — OXYCODONE AND ACETAMINOPHEN 10; 325 MG/1; MG/1
1 TABLET ORAL EVERY 6 HOURS PRN
COMMUNITY

## 2022-01-13 RX ORDER — AMOXICILLIN AND CLAVULANATE POTASSIUM 875; 125 MG/1; MG/1
1 TABLET, FILM COATED ORAL EVERY 12 HOURS SCHEDULED
Qty: 20 TABLET | Refills: 0 | Status: SHIPPED | OUTPATIENT
Start: 2022-01-13 | End: 2022-01-23

## 2022-01-13 NOTE — PROGRESS NOTES
[facility]        NAME: Larena Schirmer is a 60 y o  male  : 1965    MRN: [id number]  DATE: 2022  TIME: 5:44 PM    Assessment and Plan   Suspected COVID-19 virus infection [Z20 822]  1  Suspected COVID-19 virus infection  COVID Only -Office Collect   2  Sinus pressure  amoxicillin-clavulanate (AUGMENTIN) 875-125 mg per tablet   3  Chronic atrial fibrillation [facility])           Patient Instructions     1/2  Hold off on antibiotics pending COVID test   If COVID test comes back positive, then do not  prescription  If negative may begin taking Augmentin  If you begin taking the antibiotics, mass tenderness the entire course and not have any leftover medication  Recommend taking with food  Quarantine pending COVID-19 test results  Discussed return to care precautions as well as red flag symptoms which should prompt immediate ED referral   3  Patient reports that hesitation with anticoagulant stems from fear of taking Coumadin in particular  IA spent time discussing with patient that there are a lot of options for anticoagulation other than his Coumadin  I provided him with the name of a local cardiologist and advised him to call 1st thing tomorrow morning for an appointment  Patient verbalized understanding and is in agreement with plan  He was very appreciative for this visit  Follow up with PCP in 3-5 days  Proceed to  ER if symptoms worsen  Chief Complaint     Chief Complaint   Patient presents with    COVID-19     pt presents with sinus pressure, runny nose, chest congestion, cough, started 3 days ago; 1 of 2 covid vaccine         History of Present Illness       Patient is a 40-year-old male with past medical history chronic AFib, hypertension who presents with congestion, cough, headache, right maxillary sinus and frontal sinus pressure x3 days  No improvement with OTC meds and no known sick contacts  He received his 1st dose of the COVID vaccine in October    He denies fevers, chest pain, shortness of breath, wheezing, loss of smell or taste  Furthermore patient reports that he is in chronic AFib and does not want to take anticoagulants because of the side effect profile  He had to move because of caretaking responsibilities, so just returned to the area not long ago and has yet to become established with a new cardiologist   He denies dizziness, weakness, palpitations  Review of Systems   Review of Systems   Constitutional: Negative for activity change, chills, diaphoresis, fatigue and fever  HENT: Positive for congestion, sinus pain and sore throat (Three to 4/10 in severity, described as scratchy)  Negative for ear pain  Respiratory: Positive for cough  Negative for chest tightness, shortness of breath, wheezing and stridor  Cardiovascular: Negative for chest pain, palpitations and leg swelling  Gastrointestinal: Negative for diarrhea, nausea and vomiting  Musculoskeletal: Negative for back pain and myalgias  Skin: Negative for pallor and rash  Neurological: Positive for headaches  Negative for dizziness, syncope and weakness  Current Medications       Current Outpatient Medications:     aspirin (ECOTRIN LOW STRENGTH) 81 mg EC tablet, Take by mouth, Disp: , Rfl:     diltiazem (CARDIZEM CD) 360 MG 24 hr capsule, Take 360 mg by mouth daily  , Disp: , Rfl:     metoprolol succinate (TOPROL-XL) 50 mg 24 hr tablet, , Disp: , Rfl:     oxyCODONE-acetaminophen (PERCOCET)  mg per tablet, Take 1 tablet by mouth every 6 (six) hours as needed, Disp: , Rfl:     tadalafil (CIALIS) 5 MG tablet, Take 1 tablet (5 mg total) by mouth daily as needed for erectile dysfunction, Disp: 6 tablet, Rfl: 0    amoxicillin-clavulanate (AUGMENTIN) 875-125 mg per tablet, Take 1 tablet by mouth every 12 (twelve) hours for 10 days, Disp: 20 tablet, Rfl: 0    Current Allergies     Allergies as of 01/13/2022    (No Known Allergies)            The following portions of the patient's history were reviewed and updated as appropriate: allergies, current medications, past family history, past medical history, past social history, past surgical history and problem list      Past Medical History:   Diagnosis Date    Atrial fibrillation ([address] )     Cervicalgia     ONSET 30OCT2008    Graham Whyte infection     GERD (gastroesophageal reflux disease)     Hypersomnia with sleep apnea     RESOLVED [id number]    Hypertension        Past Surgical History:   Procedure Laterality Date    APPENDECTOMY      CERVICAL FUSION      CHOLECYSTECTOMY      COLONOSCOPY  2009    KNEE SURGERY      LAPAROSCOPIC GASTRIC BANDING      gastric sleeve    SHOULDER SURGERY         Family History   Problem Relation Age of Onset    Hyperlipidemia Mother     Hypertension Mother     Coronary artery disease Father     Hypertension Father     Stroke Father          Medications have been verified  Objective   Pulse 98   Temp (!) 97 3 °F (36 3 °C)   Resp 14   SpO2 98%        Physical Exam     Physical Exam  Vitals and nursing note reviewed  Constitutional:       General: He is not in acute distress  Appearance: Normal appearance  He is obese  He is not ill-appearing  HENT:      Head: Normocephalic and atraumatic  Right Ear: Tympanic membrane, ear canal and external ear normal       Left Ear: Tympanic membrane, ear canal and external ear normal       Nose: Congestion present  Mouth/Throat:      Mouth: Mucous membranes are moist       Pharynx: Oropharynx is clear  Eyes:      Conjunctiva/sclera: Conjunctivae normal       Pupils: Pupils are equal, round, and reactive to light  Cardiovascular:      Rate and Rhythm: Rhythm irregularly irregular  Pulmonary:      Effort: Pulmonary effort is normal  No respiratory distress  Breath sounds: Normal breath sounds  No wheezing, rhonchi or rales  Abdominal:      General: Abdomen is flat  Palpations: Abdomen is soft  Skin:     General: Skin is warm and dry  Capillary Refill: Capillary refill takes less than 2 seconds  Neurological:      Mental Status: He is alert and oriented to person, place, and time     Psychiatric:         Behavior: Behavior normal

## 2022-01-16 LAB — SARS-COV-2 RNA RESP QL NAA+PROBE: NEGATIVE

## 2022-04-01 ENCOUNTER — OFFICE VISIT (OUTPATIENT)
Dept: URGENT CARE | Facility: CLINIC | Age: 57
End: 2022-04-01
Payer: COMMERCIAL

## 2022-04-01 ENCOUNTER — APPOINTMENT (OUTPATIENT)
Dept: RADIOLOGY | Facility: CLINIC | Age: 57
End: 2022-04-01

## 2022-04-01 VITALS
SYSTOLIC BLOOD PRESSURE: 116 MMHG | BODY MASS INDEX: 38.3 KG/M2 | TEMPERATURE: 98.7 F | RESPIRATION RATE: 18 BRPM | HEIGHT: 67 IN | WEIGHT: 244 LBS | HEART RATE: 59 BPM | OXYGEN SATURATION: 97 % | DIASTOLIC BLOOD PRESSURE: 63 MMHG

## 2022-04-01 DIAGNOSIS — R07.81 RIB PAIN ON RIGHT SIDE: ICD-10-CM

## 2022-04-01 DIAGNOSIS — R07.81 RIB PAIN ON RIGHT SIDE: Primary | ICD-10-CM

## 2022-04-01 PROCEDURE — 71101 X-RAY EXAM UNILAT RIBS/CHEST: CPT

## 2022-04-01 PROCEDURE — 99213 OFFICE O/P EST LOW 20 MIN: CPT | Performed by: PHYSICIAN ASSISTANT

## 2022-04-01 RX ORDER — SOTALOL HYDROCHLORIDE 80 MG/1
TABLET ORAL
COMMUNITY
Start: 2022-02-07

## 2022-04-01 RX ORDER — RIVAROXABAN 20 MG/1
TABLET, FILM COATED ORAL
COMMUNITY
Start: 2022-02-07

## 2022-04-01 NOTE — PROGRESS NOTES
[facility]        NAME: Wilmer Sena is a 60 y o  male  : 1965    MRN: [id number]  DATE: 2022  TIME: 2:15 PM    Assessment and Plan   Rib pain on right side [R07 81]  1  Rib pain on right side  XR ribs right w pa chest min 3 views     XR with no acute abnormalities per my read (though multiple old healed fractures)  Likely costochondritis  Recommend alternating heat and ice  Stretches  Discussed strict return to care precautions as well as red flag symptoms which should prompt immediate ED referral  Pt verbalized understanding and is in agreement with plan  Please follow up with your primary care provider within the next week  Please remember that your visit today was with an urgent care provider and should not replace follow up with your primary care provider for chronic medical issues or annual physicals  Patient Instructions       Follow up with PCP in 3-5 days  Proceed to  ER if symptoms worsen  Chief Complaint     Chief Complaint   Patient presents with    Back Pain     Pt reports of right flank pain radiatiting to lower back   Pt denies any nausea or urinary s/s  History of Present Illness       Pt is a 61 yo male with pmh a fib who pw R posterolateral rib pain x 3-4 days  Began after lifting a heavy cabinet the wrong way  Occasional numbness shooting down to R leg  No hematuria, dysuria  Concerned because he states a few years ago he broke 6 ribs on that same side after a fall  Review of Systems   Review of Systems   Constitutional: Negative for chills and fever  Respiratory: Negative for shortness of breath  Cardiovascular: Negative for chest pain  Gastrointestinal: Negative for nausea and vomiting  Musculoskeletal: Negative for arthralgias, back pain, gait problem, joint swelling, myalgias and neck pain  Skin: Negative for color change and wound  Neurological: Negative for weakness and numbness           Current Medications       Current Outpatient Medications:     aspirin (ECOTRIN LOW STRENGTH) 81 mg EC tablet, Take by mouth, Disp: , Rfl:     diltiazem (CARDIZEM CD) 360 MG 24 hr capsule, Take 360 mg by mouth daily  , Disp: , Rfl:     metoprolol succinate (TOPROL-XL) 50 mg 24 hr tablet, , Disp: , Rfl:     oxyCODONE-acetaminophen (PERCOCET)  mg per tablet, Take 1 tablet by mouth every 6 (six) hours as needed, Disp: , Rfl:     sotalol (BETAPACE) 80 mg tablet, , Disp: , Rfl:     tadalafil (CIALIS) 5 MG tablet, Take 1 tablet (5 mg total) by mouth daily as needed for erectile dysfunction, Disp: 6 tablet, Rfl: 0    Xarelto 20 MG tablet, , Disp: , Rfl:     Current Allergies     Allergies as of 04/01/2022    (No Known Allergies)            The following portions of the patient's history were reviewed and updated as appropriate: allergies, current medications, past family history, past medical history, past social history, past surgical history and problem list      Past Medical History:   Diagnosis Date    Atrial fibrillation ([address] )     Cervicalgia     ONSET 30OCT2008    Graham Whyte infection     GERD (gastroesophageal reflux disease)     Hypersomnia with sleep apnea     RESOLVED [id number]    Hypertension        Past Surgical History:   Procedure Laterality Date    APPENDECTOMY      CERVICAL FUSION      CHOLECYSTECTOMY      COLONOSCOPY  2009    KNEE SURGERY      LAPAROSCOPIC GASTRIC BANDING      gastric sleeve    SHOULDER SURGERY         Family History   Problem Relation Age of Onset    Hyperlipidemia Mother     Hypertension Mother     Coronary artery disease Father     Hypertension Father     Stroke Father          Medications have been verified  Objective   /63   Pulse 59   Temp 98 7 °F (37 1 °C)   Resp 18   Ht 5' 7" (1 702 m)   Wt 111 kg (244 lb)   SpO2 97%   BMI 38 22 kg/m²        Physical Exam     Physical Exam  Vitals and nursing note reviewed     Constitutional:       General: He is not in acute distress  Appearance: Normal appearance  He is not ill-appearing  HENT:      Head: Normocephalic and atraumatic  Cardiovascular:      Rate and Rhythm: Normal rate  Pulmonary:      Effort: Pulmonary effort is normal  No respiratory distress  Musculoskeletal:      Thoracic back: Tenderness present  Lumbar back: Tenderness present  Back:    Skin:     General: Skin is warm and dry  Capillary Refill: Capillary refill takes less than 2 seconds  Neurological:      Mental Status: He is alert and oriented to person, place, and time     Psychiatric:         Behavior: Behavior normal

## 2022-11-15 ENCOUNTER — OFFICE VISIT (OUTPATIENT)
Dept: URGENT CARE | Facility: CLINIC | Age: 57
End: 2022-11-15

## 2022-11-15 VITALS
OXYGEN SATURATION: 100 % | WEIGHT: 245.4 LBS | DIASTOLIC BLOOD PRESSURE: 80 MMHG | BODY MASS INDEX: 38.52 KG/M2 | HEART RATE: 70 BPM | TEMPERATURE: 96.9 F | SYSTOLIC BLOOD PRESSURE: 120 MMHG | HEIGHT: 67 IN | RESPIRATION RATE: 18 BRPM

## 2022-11-15 DIAGNOSIS — Z20.822 CONTACT WITH AND (SUSPECTED) EXPOSURE TO COVID-19: Primary | ICD-10-CM

## 2022-11-15 RX ORDER — CLOPIDOGREL BISULFATE 75 MG/1
75 TABLET ORAL DAILY
COMMUNITY

## 2022-11-15 NOTE — PROGRESS NOTES
[facility]        NAME: Amber Carreon is a 59 y o  male  : 1965    MRN: [id number]  DATE: November 15, 2022  TIME: 4:25 PM    Assessment and Plan   Contact with and (suspected) exposure to covid-19 [Z20 822]  1  Contact with and (suspected) exposure to covid-19  Covid19 and INFLUENZA A/B PCR         Patient Instructions     Patient Instructions   COVID/flu swab performed, results to be in in 24 hours  Recommend continuing over-the-counter Tylenol, adequate fluid hydration and rest   Discussed if positive for COVID or flu to follow-up with PCP and/or cardiologist for consideration of paxlovid or monoclonal antibodies  Follow up with PCP in 3-5 days  Proceed to  ER if symptoms worsen  Chief Complaint     Chief Complaint   Patient presents with   • Cold Like Symptoms   • Cough   • Fever     Yesterday - has, laryngitis, fever 101 today with congested cough  No ear pain  Taking Tylenol - last at 2 pm           History of Present Illness       Patient is a 49-year-old male presenting today with cold symptoms x1 day  Patient notes last night he was experiencing some body aches and a fever with T-max of 101° F, states that he woke this morning with those symptoms as well as a sore throat, has been taking over-the-counter Tylenol which has resolved the fever and helped with the symptoms, states that few individuals that he was with over the weekend recently tested positive for COVID-19 and are experiencing similar symptoms  Denies chest tightness, SOB, trouble swallowing, N/V/D  Review of Systems   Review of Systems   Constitutional: Positive for fever  Negative for chills  HENT: Positive for congestion and sore throat  Negative for ear pain, sinus pressure and trouble swallowing  Eyes: Negative for pain  Respiratory: Negative for cough, chest tightness and shortness of breath  Cardiovascular: Negative for chest pain  Gastrointestinal: Negative for abdominal pain  Musculoskeletal: Positive for myalgias  Skin: Negative for rash  Neurological: Negative for headaches  Current Medications       Current Outpatient Medications:   •  clopidogrel (PLAVIX) 75 mg tablet, Take 75 mg by mouth daily, Disp: , Rfl:   •  diltiazem (CARDIZEM CD) 360 MG 24 hr capsule, Take 360 mg by mouth daily  , Disp: , Rfl:   •  metoprolol succinate (TOPROL-XL) 50 mg 24 hr tablet, , Disp: , Rfl:   •  oxyCODONE-acetaminophen (PERCOCET)  mg per tablet, Take 1 tablet by mouth every 6 (six) hours as needed, Disp: , Rfl:   •  tadalafil (CIALIS) 5 MG tablet, Take 1 tablet (5 mg total) by mouth daily as needed for erectile dysfunction, Disp: 6 tablet, Rfl: 0  •  Xarelto 20 MG tablet, , Disp: , Rfl:     Current Allergies     Allergies as of 11/15/2022   • (No Known Allergies)            The following portions of the patient's history were reviewed and updated as appropriate: allergies, current medications, past family history, past medical history, past social history, past surgical history and problem list      Past Medical History:   Diagnosis Date   • Atrial [address] ([facility] [address] )    • CAD (coronary artery disease)    • Cervicalgia     ONSET 30OCT2008   • Ethel Card infection    • GERD (gastroesophageal reflux disease)    • Hypersomnia with sleep apnea     RESOLVED [id number]   • Hypertension        Past Surgical History:   Procedure Laterality Date   • ADENOIDECTOMY     • APPENDECTOMY     • CARDIAC SURGERY  06/2022    one stent   • CERVICAL FUSION     • CHOLECYSTECTOMY     • COLONOSCOPY  2009   • KNEE SURGERY     • LAPAROSCOPIC GASTRIC BANDING      gastric sleeve   • SHOULDER SURGERY     • TONSILLECTOMY         Family History   Problem Relation Age of Onset   • Hyperlipidemia Mother    • Hypertension Mother    • Coronary artery disease Father    • Hypertension Father    • Stroke Father          Medications have been verified          Objective   /80   Pulse 70   Temp (!) 96 9 °F (36 1 °C) Resp 18   Ht 5' 7" (1 702 m)   Wt 111 kg (245 lb 6 4 oz)   SpO2 100%   BMI 38 44 kg/m²        Physical Exam     Physical Exam  Vitals reviewed  Constitutional:       General: He is not in acute distress  Appearance: Normal appearance  He is not ill-appearing  HENT:      Head: Normocephalic and atraumatic  Right Ear: Tympanic membrane, ear canal and external ear normal       Left Ear: Tympanic membrane, ear canal and external ear normal       Nose: Congestion present  Right Turbinates: Swollen and pale  Left Turbinates: Swollen and pale  Right Sinus: No maxillary sinus tenderness or frontal sinus tenderness  Left Sinus: No maxillary sinus tenderness or frontal sinus tenderness  Mouth/Throat:      Mouth: Mucous membranes are moist       Pharynx: Oropharynx is clear  Eyes:      Conjunctiva/sclera: Conjunctivae normal       Pupils: Pupils are equal, round, and reactive to light  Cardiovascular:      Rate and Rhythm: Normal rate and regular rhythm  Pulses: Normal pulses  Heart sounds: Normal heart sounds  Pulmonary:      Effort: Pulmonary effort is normal       Breath sounds: Normal breath sounds  Musculoskeletal:      Cervical back: Normal range of motion  No tenderness  Lymphadenopathy:      Cervical: No cervical adenopathy  Skin:     General: Skin is warm  Capillary Refill: Capillary refill takes less than 2 seconds  Neurological:      Mental Status: He is alert

## 2022-11-15 NOTE — PATIENT INSTRUCTIONS
COVID/flu swab performed, results to be in in 24 hours  Recommend continuing over-the-counter Tylenol, adequate fluid hydration and rest   Discussed if positive for COVID or flu to follow-up with PCP and/or cardiologist for consideration of paxlovid or monoclonal antibodies

## 2022-11-16 LAB
FLUAV RNA RESP QL NAA+PROBE: NEGATIVE
FLUBV RNA RESP QL NAA+PROBE: NEGATIVE
SARS-COV-2 RNA RESP QL NAA+PROBE: POSITIVE

## 2023-04-07 ENCOUNTER — OFFICE VISIT (OUTPATIENT)
Dept: URGENT CARE | Facility: CLINIC | Age: 58
End: 2023-04-07

## 2023-04-07 VITALS
OXYGEN SATURATION: 97 % | SYSTOLIC BLOOD PRESSURE: 125 MMHG | TEMPERATURE: 98.5 F | HEART RATE: 86 BPM | DIASTOLIC BLOOD PRESSURE: 67 MMHG | WEIGHT: 249 LBS | HEIGHT: 67 IN | BODY MASS INDEX: 39.08 KG/M2

## 2023-04-07 DIAGNOSIS — J01.10 ACUTE NON-RECURRENT FRONTAL SINUSITIS: Primary | ICD-10-CM

## 2023-04-07 RX ORDER — DOXYCYCLINE 100 MG/1
100 TABLET ORAL 2 TIMES DAILY
Qty: 14 TABLET | Refills: 0 | Status: SHIPPED | OUTPATIENT
Start: 2023-04-07 | End: 2023-04-14

## 2023-04-07 NOTE — PROGRESS NOTES
[facility]        NAME: Ladonna Ventura is a 59 y o  male  : 1965    MRN: [id number]  DATE: 2023  TIME: 2:30 PM    Assessment and Plan   Acute non-recurrent frontal sinusitis [J01 10]  1  Acute non-recurrent frontal sinusitis  doxycycline (ADOXA) 100 MG tablet            Patient Instructions     Antibiotics given for sinusitis  Recommend OTC decongestants  Avoid Sudafed secondary to Afib  Follow up with PCP in 3-5 days if no improvement  Proceed to ER if symptoms worsen  Chief Complaint     Chief Complaint   Patient presents with   • Sinusitis     Has had sinus issues x 10 days with green nasal discharge and PND facial and front head pain         History of Present Illness     Ladonna Ventura is a 59 y o  male presenting to the office today for facial pain and congestion  Symptoms have been present for 10 days  Has hx of A-fib  Review of Systems     Review of Systems   Constitutional: Negative for chills, fatigue and fever  HENT: Positive for congestion, postnasal drip, sinus pressure and sinus pain  Negative for ear discharge, ear pain and sore throat  Eyes: Negative for pain and discharge  Respiratory: Negative for cough and shortness of breath  Cardiovascular: Negative for chest pain and palpitations  Gastrointestinal: Negative for abdominal pain, diarrhea, nausea and vomiting  Genitourinary: Negative for difficulty urinating and dysuria  Musculoskeletal: Negative for arthralgias and myalgias  Skin: Negative for rash  Neurological: Negative for dizziness, syncope, light-headedness, numbness and headaches  Psychiatric/Behavioral: Negative for agitation  All other systems reviewed and are negative  Current Medications       Current Outpatient Medications:   •  clopidogrel (PLAVIX) 75 mg tablet, Take 75 mg by mouth daily, Disp: , Rfl:   •  diltiazem (CARDIZEM CD) 360 MG 24 hr capsule, Take 360 mg by mouth daily  , Disp: , Rfl:   •  doxycycline (ADOXA) "100 MG tablet, Take 1 tablet (100 mg total) by mouth 2 (two) times a day for 7 days, Disp: 14 tablet, Rfl: 0  •  metoprolol succinate (TOPROL-XL) 50 mg 24 hr tablet, , Disp: , Rfl:   •  oxyCODONE-acetaminophen (PERCOCET)  mg per tablet, Take 1 tablet by mouth every 6 (six) hours as needed, Disp: , Rfl:   •  tadalafil (CIALIS) 5 MG tablet, Take 1 tablet (5 mg total) by mouth daily as needed for erectile dysfunction, Disp: 6 tablet, Rfl: 0  •  Xarelto 20 MG tablet, , Disp: , Rfl:     Current Allergies     Allergies as of 04/07/2023   • (No Known Allergies)            The following portions of the patient's history were reviewed and updated as appropriate: allergies, current medications, past family history, past medical history, past social history, past surgical history and problem list      Past Medical History:   Diagnosis Date   • Atrial fibrillation [address] [address] )    • CAD (coronary artery disease)    • Cervicalgia     ONSET 30OCT2008   • Anastasiya Jest infection    • GERD (gastroesophageal reflux disease)    • Hypersomnia with sleep apnea     RESOLVED [id number]   • Hypertension        Past Surgical History:   Procedure Laterality Date   • ADENOIDECTOMY     • APPENDECTOMY     • CARDIAC SURGERY  06/2022    one stent   • CERVICAL FUSION     • CHOLECYSTECTOMY     • COLONOSCOPY  2009   • KNEE SURGERY     • LAPAROSCOPIC GASTRIC BANDING      gastric sleeve   • SHOULDER SURGERY     • TONSILLECTOMY         Family History   Problem Relation Age of Onset   • Hyperlipidemia Mother    • Hypertension Mother    • Coronary artery disease Father    • Hypertension Father    • Stroke Father        Medications have been verified  Objective     /67   Pulse 86   Temp 98 5 °F (36 9 °C)   Ht 5' 7\" (1 702 m)   Wt 113 kg (249 lb)   SpO2 97%   BMI 39 00 kg/m²   No LMP for male patient  Physical Exam     Physical Exam  Vitals reviewed  Constitutional:       General: He is not in acute distress       Appearance: Normal " appearance  He is not ill-appearing  HENT:      Head: Normocephalic and atraumatic  Right Ear: Tympanic membrane and ear canal normal  No middle ear effusion  Left Ear: Tympanic membrane and ear canal normal   No middle ear effusion  Nose: Congestion present  Right Sinus: Maxillary sinus tenderness and frontal sinus tenderness present  Left Sinus: Maxillary sinus tenderness and frontal sinus tenderness present  Mouth/Throat:      Mouth: Mucous membranes are moist       Pharynx: No oropharyngeal exudate  Tonsils: No tonsillar exudate  Eyes:      Extraocular Movements: Extraocular movements intact  Conjunctiva/sclera: Conjunctivae normal       Pupils: Pupils are equal, round, and reactive to light  Cardiovascular:      Rate and Rhythm: Normal rate and regular rhythm  Pulses: Normal pulses  Heart sounds: Normal heart sounds  No murmur heard  Pulmonary:      Effort: Pulmonary effort is normal  No respiratory distress  Breath sounds: Normal breath sounds  No wheezing  Musculoskeletal:      Cervical back: Normal range of motion and neck supple  No tenderness  Skin:     General: Skin is warm  Neurological:      General: No focal deficit present  Mental Status: He is alert     Psychiatric:         Mood and Affect: Mood normal          Behavior: Behavior normal          Judgment: Judgment normal

## 2023-07-13 ENCOUNTER — APPOINTMENT (OUTPATIENT)
Dept: RADIOLOGY | Facility: CLINIC | Age: 58
End: 2023-07-13
Payer: COMMERCIAL

## 2023-07-13 ENCOUNTER — TRANSCRIBE ORDERS (OUTPATIENT)
Dept: URGENT CARE | Facility: CLINIC | Age: 58
End: 2023-07-13

## 2023-07-13 DIAGNOSIS — M25.551 PAIN IN RIGHT HIP: ICD-10-CM

## 2023-07-13 DIAGNOSIS — M25.551 PAIN IN RIGHT HIP: Primary | ICD-10-CM

## 2023-07-13 PROCEDURE — 73502 X-RAY EXAM HIP UNI 2-3 VIEWS: CPT
